# Patient Record
Sex: MALE | Race: WHITE | NOT HISPANIC OR LATINO | Employment: OTHER | URBAN - METROPOLITAN AREA
[De-identification: names, ages, dates, MRNs, and addresses within clinical notes are randomized per-mention and may not be internally consistent; named-entity substitution may affect disease eponyms.]

---

## 2023-03-15 ENCOUNTER — TELEPHONE (OUTPATIENT)
Dept: PRIMARY CARE | Facility: CLINIC | Age: 83
End: 2023-03-15
Payer: MEDICARE

## 2023-03-15 DIAGNOSIS — E10.65 TYPE 1 DIABETES MELLITUS WITH HYPERGLYCEMIA (MULTI): ICD-10-CM

## 2023-03-15 NOTE — TELEPHONE ENCOUNTER
He says the Dexcom needs a prior authorization    Please call [phone]-[facility]

## 2023-03-16 RX ORDER — BUSPIRONE HYDROCHLORIDE 30 MG/1
1 TABLET ORAL 2 TIMES DAILY
COMMUNITY
Start: 2020-04-22 | End: 2023-12-11

## 2023-03-16 RX ORDER — CARVEDILOL 12.5 MG/1
1 TABLET ORAL EVERY 12 HOURS
COMMUNITY
Start: 2016-05-19 | End: 2023-08-07

## 2023-03-16 RX ORDER — ISOPROPYL ALCOHOL 0.75 G/1
SWAB TOPICAL
COMMUNITY
Start: 2022-06-14

## 2023-03-16 RX ORDER — ROSUVASTATIN CALCIUM 40 MG/1
1 TABLET, COATED ORAL DAILY
COMMUNITY
Start: 2019-03-04 | End: 2023-09-19 | Stop reason: SDUPTHER

## 2023-03-16 RX ORDER — LISINOPRIL AND HYDROCHLOROTHIAZIDE 20; 25 MG/1; MG/1
1 TABLET ORAL DAILY
COMMUNITY
Start: 2019-11-14 | End: 2023-09-19 | Stop reason: SDUPTHER

## 2023-03-16 RX ORDER — INSULIN ASPART 100 [IU]/ML
4 INJECTION, SOLUTION INTRAVENOUS; SUBCUTANEOUS
COMMUNITY
Start: 2016-05-19 | End: 2023-06-05 | Stop reason: SDUPTHER

## 2023-03-16 RX ORDER — BLOOD-GLUCOSE,RECEIVER,CONT
EACH MISCELLANEOUS
Qty: 3 EACH | Refills: 3 | Status: SHIPPED | OUTPATIENT
Start: 2023-03-16

## 2023-03-16 RX ORDER — BLOOD-GLUCOSE SENSOR
EACH MISCELLANEOUS
Qty: 1 EACH | Refills: 0 | Status: SHIPPED | OUTPATIENT
Start: 2023-03-16

## 2023-03-16 RX ORDER — LANSOPRAZOLE 30 MG/1
1 CAPSULE, DELAYED RELEASE ORAL
COMMUNITY
Start: 2020-01-08 | End: 2023-07-20 | Stop reason: SDUPTHER

## 2023-03-16 RX ORDER — INSULIN GLARGINE 300 U/ML
48 INJECTION, SOLUTION SUBCUTANEOUS
COMMUNITY
Start: 2019-12-11 | End: 2024-02-13

## 2023-03-16 RX ORDER — TRIAMCINOLONE ACETONIDE 5 MG/G
CREAM TOPICAL
COMMUNITY
Start: 2022-04-25

## 2023-03-16 RX ORDER — GLUCAGON 3 MG/1
POWDER NASAL
COMMUNITY
Start: 2021-07-12

## 2023-03-16 RX ORDER — PAROXETINE HYDROCHLORIDE 40 MG/1
1 TABLET, FILM COATED ORAL DAILY
COMMUNITY
Start: 2016-06-27 | End: 2023-06-05

## 2023-03-16 RX ORDER — ASPIRIN 81 MG/1
1 TABLET ORAL DAILY
COMMUNITY
Start: 2016-05-19 | End: 2023-06-05

## 2023-03-21 NOTE — TELEPHONE ENCOUNTER
Patient is calling to let you know he now has an account with [facility] and you can sent the script for the Dexcom to them.

## 2023-05-09 ENCOUNTER — TELEPHONE (OUTPATIENT)
Dept: PRIMARY CARE | Facility: CLINIC | Age: 83
End: 2023-05-09

## 2023-05-31 ENCOUNTER — TELEPHONE (OUTPATIENT)
Dept: PRIMARY CARE | Facility: CLINIC | Age: 83
End: 2023-05-31
Payer: MEDICARE

## 2023-05-31 NOTE — TELEPHONE ENCOUNTER
Patient has been dealing with a lot of neuropathy pain in his legs and is having trouble walking any distance. He has tried Tylenol and Ibuprofen with no relief, he is asking if you will prescribe something.       [facility]

## 2023-06-01 PROBLEM — E11.649 DIABETIC HYPOGLYCEMIA (MULTI): Status: ACTIVE | Noted: 2023-06-01

## 2023-06-01 PROBLEM — R54 FRAILTY SYNDROME IN GERIATRIC PATIENT: Status: ACTIVE | Noted: 2023-06-01

## 2023-06-01 PROBLEM — I12.9 HYPERTENSION ASSOCIATED WITH STAGE 3 CHRONIC KIDNEY DISEASE DUE TO TYPE 2 DIABETES MELLITUS (MULTI): Status: ACTIVE | Noted: 2023-06-01

## 2023-06-01 PROBLEM — I25.10 ASHD (ARTERIOSCLEROTIC HEART DISEASE): Status: ACTIVE | Noted: 2023-06-01

## 2023-06-01 PROBLEM — F33.42 DEPRESSION, MAJOR, RECURRENT, IN COMPLETE REMISSION (CMS-HCC): Status: ACTIVE | Noted: 2023-06-01

## 2023-06-01 PROBLEM — M48.062 LUMBAR STENOSIS WITH NEUROGENIC CLAUDICATION: Status: ACTIVE | Noted: 2023-06-01

## 2023-06-01 PROBLEM — M19.90 ARTHRITIS: Status: ACTIVE | Noted: 2023-06-01

## 2023-06-01 PROBLEM — M51.36 LUMBAR DEGENERATIVE DISC DISEASE: Status: ACTIVE | Noted: 2023-06-01

## 2023-06-01 PROBLEM — E10.9 TYPE 1 DIABETES MELLITUS (MULTI): Status: ACTIVE | Noted: 2023-06-01

## 2023-06-01 PROBLEM — C44.90 SKIN CANCER: Status: ACTIVE | Noted: 2023-06-01

## 2023-06-01 PROBLEM — U07.1 COVID-19: Status: ACTIVE | Noted: 2023-06-01

## 2023-06-01 PROBLEM — M51.369 LUMBAR DEGENERATIVE DISC DISEASE: Status: ACTIVE | Noted: 2023-06-01

## 2023-06-01 PROBLEM — I10 BENIGN ESSENTIAL HYPERTENSION: Status: ACTIVE | Noted: 2023-06-01

## 2023-06-01 PROBLEM — E11.22 HYPERTENSION ASSOCIATED WITH STAGE 3 CHRONIC KIDNEY DISEASE DUE TO TYPE 2 DIABETES MELLITUS (MULTI): Status: ACTIVE | Noted: 2023-06-01

## 2023-06-01 PROBLEM — L57.0 ACTINIC KERATOSIS OF LEFT TEMPLE: Status: RESOLVED | Noted: 2023-06-01 | Resolved: 2023-06-01

## 2023-06-01 PROBLEM — N18.30 HYPERTENSION ASSOCIATED WITH STAGE 3 CHRONIC KIDNEY DISEASE DUE TO TYPE 2 DIABETES MELLITUS (MULTI): Status: ACTIVE | Noted: 2023-06-01

## 2023-06-01 PROBLEM — F41.8 ANXIETY ASSOCIATED WITH DEPRESSION: Status: ACTIVE | Noted: 2023-06-01

## 2023-06-01 PROBLEM — G47.33 OBSTRUCTIVE SLEEP APNEA OF ADULT: Status: ACTIVE | Noted: 2023-06-01

## 2023-06-01 PROBLEM — E78.5 DYSLIPIDEMIA, GOAL LDL BELOW 70: Status: ACTIVE | Noted: 2023-06-01

## 2023-06-01 PROBLEM — L57.0 ACTINIC KERATOSIS OF SCALP: Status: RESOLVED | Noted: 2023-06-01 | Resolved: 2023-06-01

## 2023-06-01 PROBLEM — K21.9 GERD (GASTROESOPHAGEAL REFLUX DISEASE): Status: ACTIVE | Noted: 2023-06-01

## 2023-06-01 RX ORDER — LANCETS
EACH MISCELLANEOUS AS NEEDED
COMMUNITY
Start: 2022-08-21

## 2023-06-01 RX ORDER — BLOOD SUGAR DIAGNOSTIC
STRIP MISCELLANEOUS AS NEEDED
COMMUNITY
Start: 2023-04-03 | End: 2023-10-12

## 2023-06-05 ENCOUNTER — OFFICE VISIT (OUTPATIENT)
Dept: PRIMARY CARE | Facility: CLINIC | Age: 83
End: 2023-06-05
Payer: MEDICARE

## 2023-06-05 VITALS
BODY MASS INDEX: 36.46 KG/M2 | SYSTOLIC BLOOD PRESSURE: 140 MMHG | WEIGHT: 240.6 LBS | OXYGEN SATURATION: 96 % | HEIGHT: 68 IN | HEART RATE: 57 BPM | DIASTOLIC BLOOD PRESSURE: 66 MMHG

## 2023-06-05 DIAGNOSIS — F33.42 DEPRESSION, MAJOR, RECURRENT, IN COMPLETE REMISSION (CMS-HCC): ICD-10-CM

## 2023-06-05 DIAGNOSIS — E08.22 DIABETES MELLITUS DUE TO UNDERLYING CONDITION WITH STAGE 3A CHRONIC KIDNEY DISEASE, WITH LONG-TERM CURRENT USE OF INSULIN (MULTI): ICD-10-CM

## 2023-06-05 DIAGNOSIS — Z79.4 DIABETES MELLITUS DUE TO UNDERLYING CONDITION WITH STAGE 3A CHRONIC KIDNEY DISEASE, WITH LONG-TERM CURRENT USE OF INSULIN (MULTI): ICD-10-CM

## 2023-06-05 DIAGNOSIS — M25.552 JOINT PAIN OF LEFT HIP ON MOVEMENT: Primary | ICD-10-CM

## 2023-06-05 DIAGNOSIS — N18.31 DIABETES MELLITUS DUE TO UNDERLYING CONDITION WITH STAGE 3A CHRONIC KIDNEY DISEASE, WITH LONG-TERM CURRENT USE OF INSULIN (MULTI): ICD-10-CM

## 2023-06-05 DIAGNOSIS — M48.062 LUMBAR STENOSIS WITH NEUROGENIC CLAUDICATION: ICD-10-CM

## 2023-06-05 DIAGNOSIS — E10.649 TYPE 1 DIABETES MELLITUS WITH HYPOGLYCEMIA AND WITHOUT COMA (MULTI): ICD-10-CM

## 2023-06-05 DIAGNOSIS — E11.649 DIABETIC HYPOGLYCEMIA (MULTI): ICD-10-CM

## 2023-06-05 DIAGNOSIS — E78.5 DYSLIPIDEMIA, GOAL LDL BELOW 70: ICD-10-CM

## 2023-06-05 PROBLEM — E66.01 CLASS 2 SEVERE OBESITY DUE TO EXCESS CALORIES WITH SERIOUS COMORBIDITY AND BODY MASS INDEX (BMI) OF 36.0 TO 36.9 IN ADULT (MULTI): Status: ACTIVE | Noted: 2023-06-05

## 2023-06-05 PROBLEM — I10 BENIGN ESSENTIAL HYPERTENSION: Status: RESOLVED | Noted: 2023-06-01 | Resolved: 2023-06-05

## 2023-06-05 PROBLEM — E66.812 CLASS 2 SEVERE OBESITY DUE TO EXCESS CALORIES WITH SERIOUS COMORBIDITY AND BODY MASS INDEX (BMI) OF 36.0 TO 36.9 IN ADULT: Status: ACTIVE | Noted: 2023-06-05

## 2023-06-05 PROCEDURE — 1160F RVW MEDS BY RX/DR IN RCRD: CPT | Performed by: INTERNAL MEDICINE

## 2023-06-05 PROCEDURE — 99213 OFFICE O/P EST LOW 20 MIN: CPT | Performed by: INTERNAL MEDICINE

## 2023-06-05 PROCEDURE — 1159F MED LIST DOCD IN RCRD: CPT | Performed by: INTERNAL MEDICINE

## 2023-06-05 PROCEDURE — 3077F SYST BP >= 140 MM HG: CPT | Performed by: INTERNAL MEDICINE

## 2023-06-05 PROCEDURE — 1036F TOBACCO NON-USER: CPT | Performed by: INTERNAL MEDICINE

## 2023-06-05 PROCEDURE — 3078F DIAST BP <80 MM HG: CPT | Performed by: INTERNAL MEDICINE

## 2023-06-05 RX ORDER — INSULIN ASPART 100 [IU]/ML
100 INJECTION, SOLUTION INTRAVENOUS; SUBCUTANEOUS
COMMUNITY
End: 2024-03-14

## 2023-06-05 ASSESSMENT — ANXIETY QUESTIONNAIRES
1. FEELING NERVOUS, ANXIOUS, OR ON EDGE: NOT AT ALL
5. BEING SO RESTLESS THAT IT IS HARD TO SIT STILL: NOT AT ALL
4. TROUBLE RELAXING: NOT AT ALL
6. BECOMING EASILY ANNOYED OR IRRITABLE: NOT AT ALL
GAD7 TOTAL SCORE: 0
2. NOT BEING ABLE TO STOP OR CONTROL WORRYING: NOT AT ALL
3. WORRYING TOO MUCH ABOUT DIFFERENT THINGS: NOT AT ALL
IF YOU CHECKED OFF ANY PROBLEMS ON THIS QUESTIONNAIRE, HOW DIFFICULT HAVE THESE PROBLEMS MADE IT FOR YOU TO DO YOUR WORK, TAKE CARE OF THINGS AT HOME, OR GET ALONG WITH OTHER PEOPLE: NOT DIFFICULT AT ALL
7. FEELING AFRAID AS IF SOMETHING AWFUL MIGHT HAPPEN: NOT AT ALL

## 2023-06-05 ASSESSMENT — ENCOUNTER SYMPTOMS
LOSS OF SENSATION IN FEET: 0
LEG PAIN: 1
ARTHRALGIAS: 1
DEPRESSION: 0
OCCASIONAL FEELINGS OF UNSTEADINESS: 0

## 2023-06-05 ASSESSMENT — COLUMBIA-SUICIDE SEVERITY RATING SCALE - C-SSRS
1. IN THE PAST MONTH, HAVE YOU WISHED YOU WERE DEAD OR WISHED YOU COULD GO TO SLEEP AND NOT WAKE UP?: NO
6. HAVE YOU EVER DONE ANYTHING, STARTED TO DO ANYTHING, OR PREPARED TO DO ANYTHING TO END YOUR LIFE?: NO
2. HAVE YOU ACTUALLY HAD ANY THOUGHTS OF KILLING YOURSELF?: NO

## 2023-06-05 ASSESSMENT — PATIENT HEALTH QUESTIONNAIRE - PHQ9
2. FEELING DOWN, DEPRESSED OR HOPELESS: NOT AT ALL
1. LITTLE INTEREST OR PLEASURE IN DOING THINGS: NOT AT ALL
SUM OF ALL RESPONSES TO PHQ9 QUESTIONS 1 AND 2: 0

## 2023-06-05 NOTE — PROGRESS NOTES
"Subjective   Reason for Visit: Juanjose Douglass is an 82 y.o. male here for a   visit.     Past Medical, Surgical, and Family History reviewed and updated in chart.    Reviewed all medications by prescribing practitioner or clinical pharmacist (such as prescriptions, OTCs, herbal therapies and supplements) and documented in the medical record.    Hypoglycemia event 2 months ago taking a shower dropped to 42 two months fell straight down and leg anterior thigh hurts  and leg is weak. No pain at rest but getting out of chair and standing produces heidy even with walker . Localized no pain in back or lower leg .  Cannot swing golf club without losing balance  at follow through . Groin pain     Leg Pain   The injury mechanism was a fall. The pain is present in the left hip and left thigh. The quality of the pain is described as burning. The pain is at a severity of 8/10. The pain is moderate. The pain has been Worsening since onset. The symptoms are aggravated by movement and weight bearing. He has tried NSAIDs for the symptoms. The treatment provided mild relief.       Patient Care Team:  Ki Browning DO as PCP - General  Ki Browning DO as PCP - [employer] PCP     Review of Systems   Musculoskeletal:  Positive for arthralgias.       Objective   Vitals:  /66   Pulse 57   Ht 1.727 m (5' 8\")   Wt 109 kg (240 lb 9.6 oz)   SpO2 96%   BMI 36.58 kg/m²       Physical Exam  Vitals and nursing note reviewed.   Constitutional:       General: He is not in acute distress.     Appearance: Normal appearance. He is well-developed. He is not toxic-appearing.   HENT:      Head: Normocephalic and atraumatic.      Right Ear: Tympanic membrane and external ear normal.      Left Ear: Tympanic membrane and external ear normal.      Nose: Nose normal.      Mouth/Throat:      Mouth: Mucous membranes are moist.      Pharynx: Oropharynx is clear. No oropharyngeal exudate or posterior oropharyngeal " erythema.      Tonsils: No tonsillar exudate. 2+ on the right. 2+ on the left.   Eyes:      Extraocular Movements: Extraocular movements intact.      Conjunctiva/sclera: Conjunctivae normal.   Cardiovascular:      Rate and Rhythm: Normal rate and regular rhythm.      Pulses: Normal pulses.      Heart sounds: Normal heart sounds. No murmur heard.  Pulmonary:      Effort: Pulmonary effort is normal.      Breath sounds: Normal breath sounds.   Abdominal:      General: Abdomen is flat. Bowel sounds are normal.      Palpations: Abdomen is soft.   Musculoskeletal:      Cervical back: Neck supple.   Feet:      Right foot:      Skin integrity: Skin integrity normal. No ulcer, blister, skin breakdown, erythema, warmth or callus.      Toenail Condition: Right toenails are normal.      Left foot:      Skin integrity: Skin integrity normal. No ulcer, blister, skin breakdown, erythema, warmth or callus.      Toenail Condition: Left toenails are normal.   Lymphadenopathy:      Cervical: No cervical adenopathy.   Skin:     General: Skin is warm and dry.      Capillary Refill: Capillary refill takes more than 3 seconds.      Findings: No rash.   Neurological:      Mental Status: He is alert. Mental status is at baseline.      Sensory: Sensation is intact.   Psychiatric:         Mood and Affect: Mood normal.         Behavior: Behavior normal.         Thought Content: Thought content normal.         Judgment: Judgment normal.         Assessment/Plan   Problem List Items Addressed This Visit          Musculoskeletal    Lumbar stenosis with neurogenic claudication    Current Assessment & Plan     Consider reevaluation with pain management for pain control in the setting of lumbar spine stenosis         Relevant Orders    XR femur left 2+ views    Referral to Orthopaedic Surgery    Comprehensive Metabolic Panel    Hemoglobin A1C    Lipid Panel    Albumin , Urine Random    Vitamin D 25-Hydroxy,Total    Vitamin B12    CBC and Auto  Differential    Joint pain of left hip on movement - Primary    Current Assessment & Plan     Suspect suspect severe osteoarthritis of the hip we will check x-rays of the left hip could be referred pain from lumbar spine presents as anterior thigh pain worse with prolonged standing and ambulation better at rest Limited range of motion with movement of hip and range of motion with OMAIRA testing.  Sensory pain in L2-L3 distribution denies any back pain or lower leg pain or swelling reevaluate with x-rays of the femur and hip after patient sustained a fall 2 months ago         Relevant Orders    XR hip left 2 or 3 views    XR femur left 2+ views    Referral to Orthopaedic Surgery    Comprehensive Metabolic Panel    Hemoglobin A1C    Lipid Panel    Albumin , Urine Random    Vitamin D 25-Hydroxy,Total    Vitamin B12    CBC and Auto Differential       Endocrine/Metabolic    Diabetic hypoglycemia (CMS/HCC)    Current Assessment & Plan     Stable and improved at this time has glucagon in case of emergency as well as use of glucose tablets recent modifications in insulin have been helpful         Diabetes 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 3a chronic kidney disease, with long-term current use of insulin (CMS/HCC)    Current Assessment & Plan     Repeat hemoglobin A1c on insulin management therapy hemoglobin A1c goal less than 9.0 with history of hypoglycemia          Type 1 diabetes mellitus (CMS/HCC)    Current Assessment & Plan     Continue with basal bolus treatment with Lantus and Humalog insulin            Other    Depression, major, recurrent, in complete remission (CMS/HCC)    Current Assessment & Plan     Continue buspirone 30 mg twice a day         Dyslipidemia, goal LDL below 70    Current Assessment & Plan     Repeat lipid panel maximal dose rosuvastatin 40 mg daily LDL goal less than 70

## 2023-06-06 NOTE — ASSESSMENT & PLAN NOTE
Suspect suspect severe osteoarthritis of the hip we will check x-rays of the left hip could be referred pain from lumbar spine presents as anterior thigh pain worse with prolonged standing and ambulation better at rest Limited range of motion with movement of hip and range of motion with OMAIRA testing.  Sensory pain in L2-L3 distribution denies any back pain or lower leg pain or swelling reevaluate with x-rays of the femur and hip after patient sustained a fall 2 months ago

## 2023-06-06 NOTE — ASSESSMENT & PLAN NOTE
Repeat hemoglobin A1c on insulin management therapy hemoglobin A1c goal less than 9.0 with history of hypoglycemia

## 2023-06-06 NOTE — ASSESSMENT & PLAN NOTE
Stable and improved at this time has glucagon in case of emergency as well as use of glucose tablets recent modifications in insulin have been helpful

## 2023-06-06 NOTE — ASSESSMENT & PLAN NOTE
Consider reevaluation with pain management for pain control in the setting of lumbar spine stenosis

## 2023-06-06 NOTE — ASSESSMENT & PLAN NOTE
>>ASSESSMENT AND PLAN FOR TYPE 1 DIABETES MELLITUS WITH HYPOGLYCEMIA AND WITHOUT COMA (CMS/[facility]) WRITTEN ON 6/5/2023 11:49 PM BY YAJAIRA FAJARDO, DO    Continue with basal bolus treatment with Lantus and Humalog insulin    >>ASSESSMENT AND PLAN FOR DIABETIC HYPOGLYCEMIA (CMS/[facility]) WRITTEN ON 6/5/2023 11:44 PM BY YAJAIRA FAJARDO, DO    Stable and improved at this time has glucagon in case of emergency as well as use of glucose tablets recent modifications in insulin have been helpful

## 2023-07-15 DIAGNOSIS — F33.42 DEPRESSION, MAJOR, RECURRENT, IN COMPLETE REMISSION (CMS-HCC): ICD-10-CM

## 2023-07-15 DIAGNOSIS — K21.9 GASTROESOPHAGEAL REFLUX DISEASE WITHOUT ESOPHAGITIS: Primary | ICD-10-CM

## 2023-07-20 RX ORDER — PAROXETINE HYDROCHLORIDE 40 MG/1
TABLET, FILM COATED ORAL
Qty: 90 TABLET | Refills: 2 | Status: SHIPPED | OUTPATIENT
Start: 2023-07-20 | End: 2023-10-11 | Stop reason: ALTCHOICE

## 2023-07-20 RX ORDER — LANSOPRAZOLE 30 MG/1
CAPSULE, DELAYED RELEASE ORAL
Qty: 90 CAPSULE | Refills: 2 | Status: SHIPPED | OUTPATIENT
Start: 2023-07-20

## 2023-07-24 ENCOUNTER — TELEPHONE (OUTPATIENT)
Dept: PRIMARY CARE | Facility: CLINIC | Age: 83
End: 2023-07-24
Payer: MEDICARE

## 2023-07-24 DIAGNOSIS — M51.36 LUMBAR DEGENERATIVE DISC DISEASE: ICD-10-CM

## 2023-07-24 DIAGNOSIS — M48.062 LUMBAR STENOSIS WITH NEUROGENIC CLAUDICATION: ICD-10-CM

## 2023-07-24 DIAGNOSIS — R54 FRAILTY SYNDROME IN GERIATRIC PATIENT: ICD-10-CM

## 2023-07-24 DIAGNOSIS — M19.90 ARTHRITIS: ICD-10-CM

## 2023-07-24 DIAGNOSIS — M25.552 JOINT PAIN OF LEFT HIP ON MOVEMENT: ICD-10-CM

## 2023-08-05 DIAGNOSIS — I25.10 ASHD (ARTERIOSCLEROTIC HEART DISEASE): Primary | ICD-10-CM

## 2023-08-07 RX ORDER — CARVEDILOL 12.5 MG/1
12.5 TABLET ORAL EVERY 12 HOURS
Qty: 180 TABLET | Refills: 3 | Status: SHIPPED | OUTPATIENT
Start: 2023-08-07

## 2023-09-19 ENCOUNTER — TELEPHONE (OUTPATIENT)
Dept: PRIMARY CARE | Facility: CLINIC | Age: 83
End: 2023-09-19
Payer: MEDICARE

## 2023-09-19 DIAGNOSIS — I10 PRIMARY HYPERTENSION: ICD-10-CM

## 2023-09-19 DIAGNOSIS — E78.5 DYSLIPIDEMIA, GOAL LDL BELOW 70: Primary | ICD-10-CM

## 2023-09-19 RX ORDER — ROSUVASTATIN CALCIUM 40 MG/1
40 TABLET, COATED ORAL DAILY
Qty: 90 TABLET | Refills: 3 | Status: SHIPPED | OUTPATIENT
Start: 2023-09-19

## 2023-09-19 RX ORDER — LISINOPRIL AND HYDROCHLOROTHIAZIDE 20; 25 MG/1; MG/1
1 TABLET ORAL DAILY
Qty: 90 TABLET | Refills: 3 | Status: SHIPPED | OUTPATIENT
Start: 2023-09-19

## 2023-09-19 NOTE — TELEPHONE ENCOUNTER
Needs a refill on his Atorvastatin 40 mg takes it 1 time a day & Lisinopril 20-25 takes it 1 time a day please send to his mail ordert [facility]

## 2023-10-10 DIAGNOSIS — Z23 FLU VACCINE NEED: ICD-10-CM

## 2023-10-11 ENCOUNTER — OFFICE VISIT (OUTPATIENT)
Dept: PRIMARY CARE | Facility: CLINIC | Age: 83
End: 2023-10-11
Payer: MEDICARE

## 2023-10-11 VITALS
BODY MASS INDEX: 36.83 KG/M2 | SYSTOLIC BLOOD PRESSURE: 110 MMHG | HEIGHT: 68 IN | HEART RATE: 68 BPM | WEIGHT: 243 LBS | DIASTOLIC BLOOD PRESSURE: 68 MMHG

## 2023-10-11 DIAGNOSIS — I11.0 HYPERTENSIVE HEART DISEASE WITH CHRONIC DIASTOLIC CONGESTIVE HEART FAILURE (MULTI): ICD-10-CM

## 2023-10-11 DIAGNOSIS — E66.01 CLASS 2 SEVERE OBESITY DUE TO EXCESS CALORIES WITH SERIOUS COMORBIDITY AND BODY MASS INDEX (BMI) OF 36.0 TO 36.9 IN ADULT (MULTI): ICD-10-CM

## 2023-10-11 DIAGNOSIS — I50.32 HYPERTENSIVE HEART DISEASE WITH CHRONIC DIASTOLIC CONGESTIVE HEART FAILURE (MULTI): ICD-10-CM

## 2023-10-11 DIAGNOSIS — M48.062 LUMBAR STENOSIS WITH NEUROGENIC CLAUDICATION: ICD-10-CM

## 2023-10-11 DIAGNOSIS — Z79.4 DIABETES MELLITUS DUE TO UNDERLYING CONDITION WITH STAGE 3A CHRONIC KIDNEY DISEASE, WITH LONG-TERM CURRENT USE OF INSULIN (MULTI): ICD-10-CM

## 2023-10-11 DIAGNOSIS — E08.22 DIABETES MELLITUS DUE TO UNDERLYING CONDITION WITH STAGE 3A CHRONIC KIDNEY DISEASE, WITH LONG-TERM CURRENT USE OF INSULIN (MULTI): ICD-10-CM

## 2023-10-11 DIAGNOSIS — E78.5 DYSLIPIDEMIA, GOAL LDL BELOW 70: ICD-10-CM

## 2023-10-11 DIAGNOSIS — K21.9 GASTROESOPHAGEAL REFLUX DISEASE WITHOUT ESOPHAGITIS: ICD-10-CM

## 2023-10-11 DIAGNOSIS — E10.649 TYPE 1 DIABETES MELLITUS WITH HYPOGLYCEMIA AND WITHOUT COMA (MULTI): Primary | ICD-10-CM

## 2023-10-11 DIAGNOSIS — F33.42 DEPRESSION, MAJOR, RECURRENT, IN COMPLETE REMISSION (CMS-HCC): ICD-10-CM

## 2023-10-11 DIAGNOSIS — E11.649 DIABETIC HYPOGLYCEMIA (MULTI): ICD-10-CM

## 2023-10-11 DIAGNOSIS — R54 FRAILTY SYNDROME IN GERIATRIC PATIENT: ICD-10-CM

## 2023-10-11 DIAGNOSIS — N18.31 DIABETES MELLITUS DUE TO UNDERLYING CONDITION WITH STAGE 3A CHRONIC KIDNEY DISEASE, WITH LONG-TERM CURRENT USE OF INSULIN (MULTI): ICD-10-CM

## 2023-10-11 PROBLEM — M25.552 JOINT PAIN OF LEFT HIP ON MOVEMENT: Status: RESOLVED | Noted: 2023-06-05 | Resolved: 2023-10-11

## 2023-10-11 PROCEDURE — 1159F MED LIST DOCD IN RCRD: CPT | Performed by: INTERNAL MEDICINE

## 2023-10-11 PROCEDURE — 99214 OFFICE O/P EST MOD 30 MIN: CPT | Performed by: INTERNAL MEDICINE

## 2023-10-11 PROCEDURE — 1160F RVW MEDS BY RX/DR IN RCRD: CPT | Performed by: INTERNAL MEDICINE

## 2023-10-11 PROCEDURE — 1036F TOBACCO NON-USER: CPT | Performed by: INTERNAL MEDICINE

## 2023-10-11 NOTE — PROGRESS NOTES
"Subjective   Patient ID: Juanjose Douglass is a 83 y.o. male who presents for Follow-up.    HPI     Review of Systems    Objective   /68 (BP Location: Left arm, Patient Position: Sitting)   Pulse 68   Ht 1.727 m (5' 8\")   Wt 110 kg (243 lb)   BMI 36.95 kg/m²     Physical Exam    Assessment/Plan          "

## 2023-10-11 NOTE — ASSESSMENT & PLAN NOTE
Continues to manage anxiety and depression symptoms well on buspirone 30 mg twice a day patient discontinued paroxetine

## 2023-10-11 NOTE — PROGRESS NOTES
"Subjective   Reason for Visit: Juanjose Douglass is an 83 y.o. male here for a fu visit.     Past Medical, Surgical, and Family History reviewed and updated in chart.    Reviewed all medications by prescribing practitioner or clinical pharmacist (such as prescriptions, OTCs, herbal therapies and supplements) and documented in the medical record.    HPI    Patient Care Team:  Ki Browning DO as PCP - General  Ki Browning DO as PCP - [employer] PCP     Review of Systems   All other systems reviewed and are negative.      Objective   Vitals:  /68 (BP Location: Left arm, Patient Position: Sitting)   Pulse 68   Ht 1.727 m (5' 8\")   Wt 110 kg (243 lb)   BMI 36.95 kg/m²       Physical Exam  Vitals and nursing note reviewed.   Constitutional:       General: He is not in acute distress.     Appearance: Normal appearance. He is well-developed. He is obese. He is not toxic-appearing.   HENT:      Head: Normocephalic and atraumatic.      Right Ear: Tympanic membrane and external ear normal.      Left Ear: Tympanic membrane and external ear normal.      Nose: Nose normal.      Mouth/Throat:      Mouth: Mucous membranes are moist.      Pharynx: Oropharynx is clear. No oropharyngeal exudate or posterior oropharyngeal erythema.      Tonsils: No tonsillar exudate. 2+ on the right. 2+ on the left.   Eyes:      Extraocular Movements: Extraocular movements intact.      Conjunctiva/sclera: Conjunctivae normal.   Cardiovascular:      Rate and Rhythm: Normal rate and regular rhythm.      Pulses: Normal pulses.      Heart sounds: Normal heart sounds. No murmur heard.  Pulmonary:      Effort: Pulmonary effort is normal.      Breath sounds: Normal breath sounds.   Abdominal:      General: Abdomen is flat. Bowel sounds are normal.      Palpations: Abdomen is soft.   Musculoskeletal:      Cervical back: Neck supple.   Feet:      Right foot:      Skin integrity: Skin integrity normal. No ulcer, blister, " skin breakdown, erythema, warmth or callus.      Toenail Condition: Right toenails are normal.      Left foot:      Skin integrity: Skin integrity normal. No ulcer, blister, skin breakdown, erythema, warmth or callus.      Toenail Condition: Left toenails are normal.   Lymphadenopathy:      Cervical: No cervical adenopathy.   Skin:     General: Skin is warm and dry.      Capillary Refill: Capillary refill takes more than 3 seconds.      Findings: No rash.   Neurological:      Mental Status: He is alert. Mental status is at baseline.      Sensory: Sensation is intact.   Psychiatric:         Mood and Affect: Mood normal.         Behavior: Behavior normal.         Thought Content: Thought content normal.         Judgment: Judgment normal.         Assessment/Plan   Problem List Items Addressed This Visit       Depression, major, recurrent, in complete remission (CMS/HCC)    Current Assessment & Plan     Continues to manage anxiety and depression symptoms well on buspirone 30 mg twice a day patient discontinued paroxetine         Diabetic hypoglycemia (CMS/HCC)    Current Assessment & Plan     Significant improvement in limiting hypoglycemia with initiation of Dexcom G for continuous glucose monitoring patient averaging blood sugars between 150 and 200 Stam most times under 200 with no significant hypoglycemic events reevaluate hemoglobin A1c when he returns continue Toujeo insulin at 48 units daily with use of NovoLog insulin with meals averaging between 5 and 10 units daily with meals safe         Relevant Orders    Comprehensive Metabolic Panel    Hemoglobin A1C    Lipid Panel    Albumin , Urine Random    CBC and Auto Differential    Follow Up In [facility] - PCP - Established    Tsh With Reflex To Free T4 If Abnormal    Dyslipidemia, goal LDL below 70    Current Assessment & Plan     Reevaluate lipid profile with rosuvastatin 40 mg 1 tablet daily LDL goal less than 70         Relevant Orders     Comprehensive Metabolic Panel    Hemoglobin A1C    Lipid Panel    Albumin , Urine Random    CBC and Auto Differential    Follow Up In [facility] - PCP - Established    Tsh With Reflex To Free T4 If Abnormal    Frailty syndrome in geriatric patient    Current Assessment & Plan     Stable at this time has good family support         Relevant Orders    Comprehensive Metabolic Panel    Hemoglobin A1C    Lipid Panel    Albumin , Urine Random    CBC and Auto Differential    Follow Up In [facility] - PCP - Established    Tsh With Reflex To Free T4 If Abnormal    GERD (gastroesophageal reflux disease)    Current Assessment & Plan     Symptoms controlled on lansoprazole 30 mg daily         Diabetes 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 3a chronic kidney disease, with long-term current use of insulin (CMS/[facility])    Current Assessment & Plan     Reevaluate complete metabolic profile and urine microalbumin         Relevant Orders    Comprehensive Metabolic Panel    Hemoglobin A1C    Lipid Panel    Albumin , Urine Random    CBC and Auto Differential    Follow Up In [facility] - PCP - Established    Tsh With Reflex To Free T4 If Abnormal    Lumbar stenosis with neurogenic claudication    Current Assessment & Plan     Continue to manage with activity as tolerated intolerant with gabapentin due to side effects         Type 1 diabetes mellitus (CMS/[facility]) - Primary    Current Assessment & Plan     No significant hypoglycemic events or DKA noted reevaluate with blood work         Relevant Orders    Comprehensive Metabolic Panel    Hemoglobin A1C    Lipid Panel    Albumin , Urine Random    CBC and Auto Differential    Follow Up In [facility] - PCP - Established    Tsh With Reflex To Free T4 If Abnormal    Class 2 severe obesity due to excess calories with serious comorbidity and body mass index (BMI) of 36.0 to 36.9 in adult (CMS/[facility])    Overview     Continue to work with regular  exercise and reduce consumption of high processed foods optimizing A1c goal less than 9.0 while avoiding unnecessary hypoglycemia secondary to age and frailty         Current Assessment & Plan     Continue to work with regular exercise and reduce consumption of high processed foods optimizing A1c goal less than 9.0 while avoiding unnecessary hypoglycemia secondary to age and   Patient hampered by lack of activity related to chronic radicular pain of the low back          Relevant Orders    Comprehensive Metabolic Panel    Hemoglobin A1C    Lipid Panel    Albumin , Urine Random    CBC and Auto Differential    Follow Up In [facility] - PCP - Established    Tsh With Reflex To Free T4 If Abnormal    Hypertensive heart disease with chronic diastolic congestive heart failure (CMS/HCC)    Current Assessment & Plan     Stable well compensated on carvedilol 25 mg twice a day and lisinopril hydrochlorothiazide 20/25 1 tablet daily well compensated with good blood pressure control continue reevaluation in 3 months updated influenza vaccination today

## 2023-10-11 NOTE — ASSESSMENT & PLAN NOTE
Significant improvement in limiting hypoglycemia with initiation of Dexcom G for continuous glucose monitoring patient averaging blood sugars between 150 and 200 Stam most times under 200 with no significant hypoglycemic events reevaluate hemoglobin A1c when he returns continue Toujeo insulin at 48 units daily with use of NovoLog insulin with meals averaging between 5 and 10 units daily with meals safe

## 2023-10-11 NOTE — ASSESSMENT & PLAN NOTE
>>ASSESSMENT AND PLAN FOR TYPE 1 DIABETES MELLITUS WITH HYPOGLYCEMIA AND WITHOUT COMA (CMS/[facility]) WRITTEN ON 10/11/2023  1:52 PM BY YAJAIRA FAJARDO, DO    No significant hypoglycemic events or DKA noted reevaluate with blood work    >>ASSESSMENT AND PLAN FOR DIABETIC HYPOGLYCEMIA (CMS/[facility]) WRITTEN ON 10/11/2023  1:52 PM BY YAJAIRA FAJARDO, DO    Significant improvement in limiting hypoglycemia with initiation of Dexcom G for continuous glucose monitoring patient averaging blood sugars between 150 and 200 Stam most times under 200 with no significant hypoglycemic events reevaluate hemoglobin A1c when he returns continue Toujeo insulin at 48 units daily with use of NovoLog insulin with meals averaging between 5 and 10 units daily with meals safe

## 2023-10-11 NOTE — ASSESSMENT & PLAN NOTE
Continue to work with regular exercise and reduce consumption of high processed foods optimizing A1c goal less than 9.0 while avoiding unnecessary hypoglycemia secondary to age and   Patient hampered by lack of activity related to chronic radicular pain of the low back

## 2023-10-11 NOTE — ASSESSMENT & PLAN NOTE
Stable well compensated on carvedilol 25 mg twice a day and lisinopril hydrochlorothiazide 20/25 1 tablet daily well compensated with good blood pressure control continue reevaluation in 3 months updated influenza vaccination today

## 2023-10-12 RX ORDER — BLOOD SUGAR DIAGNOSTIC
STRIP MISCELLANEOUS
Qty: 350 STRIP | Refills: 10 | Status: SHIPPED | OUTPATIENT
Start: 2023-10-12

## 2023-12-09 DIAGNOSIS — F33.42 DEPRESSION, MAJOR, RECURRENT, IN COMPLETE REMISSION (CMS-HCC): Primary | ICD-10-CM

## 2023-12-09 DIAGNOSIS — F41.9 ANXIETY: ICD-10-CM

## 2023-12-11 RX ORDER — BUSPIRONE HYDROCHLORIDE 30 MG/1
30 TABLET ORAL 2 TIMES DAILY
Qty: 180 TABLET | Refills: 3 | Status: SHIPPED | OUTPATIENT
Start: 2023-12-11

## 2024-01-17 ENCOUNTER — LAB (OUTPATIENT)
Dept: LAB | Facility: LAB | Age: 84
End: 2024-01-17
Payer: MEDICARE

## 2024-01-17 DIAGNOSIS — E11.649 DIABETIC HYPOGLYCEMIA (MULTI): ICD-10-CM

## 2024-01-17 DIAGNOSIS — E10.649 TYPE 1 DIABETES MELLITUS WITH HYPOGLYCEMIA AND WITHOUT COMA (MULTI): ICD-10-CM

## 2024-01-17 DIAGNOSIS — E03.9 HYPOTHYROIDISM (ACQUIRED): Primary | ICD-10-CM

## 2024-01-17 DIAGNOSIS — E66.01 CLASS 2 SEVERE OBESITY DUE TO EXCESS CALORIES WITH SERIOUS COMORBIDITY AND BODY MASS INDEX (BMI) OF 36.0 TO 36.9 IN ADULT (MULTI): ICD-10-CM

## 2024-01-17 DIAGNOSIS — E55.9 VITAMIN D DEFICIENCY: ICD-10-CM

## 2024-01-17 DIAGNOSIS — Z79.4 DIABETES MELLITUS DUE TO UNDERLYING CONDITION WITH STAGE 3A CHRONIC KIDNEY DISEASE, WITH LONG-TERM CURRENT USE OF INSULIN (MULTI): ICD-10-CM

## 2024-01-17 DIAGNOSIS — M25.552 JOINT PAIN OF LEFT HIP ON MOVEMENT: ICD-10-CM

## 2024-01-17 DIAGNOSIS — E78.5 DYSLIPIDEMIA, GOAL LDL BELOW 70: ICD-10-CM

## 2024-01-17 DIAGNOSIS — R54 FRAILTY SYNDROME IN GERIATRIC PATIENT: ICD-10-CM

## 2024-01-17 DIAGNOSIS — E08.22 DIABETES MELLITUS DUE TO UNDERLYING CONDITION WITH STAGE 3A CHRONIC KIDNEY DISEASE, WITH LONG-TERM CURRENT USE OF INSULIN (MULTI): ICD-10-CM

## 2024-01-17 DIAGNOSIS — N18.31 DIABETES MELLITUS DUE TO UNDERLYING CONDITION WITH STAGE 3A CHRONIC KIDNEY DISEASE, WITH LONG-TERM CURRENT USE OF INSULIN (MULTI): ICD-10-CM

## 2024-01-17 DIAGNOSIS — M48.062 LUMBAR STENOSIS WITH NEUROGENIC CLAUDICATION: ICD-10-CM

## 2024-01-17 LAB
25(OH)D3 SERPL-MCNC: 15 NG/ML (ref 30–100)
ALBUMIN SERPL BCP-MCNC: 3.7 G/DL (ref 3.4–5)
ALP SERPL-CCNC: 87 U/L (ref 33–136)
ALT SERPL W P-5'-P-CCNC: 13 U/L (ref 10–52)
ANION GAP SERPL CALC-SCNC: 15 MMOL/L (ref 10–20)
AST SERPL W P-5'-P-CCNC: 19 U/L (ref 9–39)
BASOPHILS # BLD AUTO: 0.06 X10*3/UL (ref 0–0.1)
BASOPHILS NFR BLD AUTO: 1.1 %
BILIRUB SERPL-MCNC: 0.4 MG/DL (ref 0–1.2)
BUN SERPL-MCNC: 31 MG/DL (ref 6–23)
CALCIUM SERPL-MCNC: 8.6 MG/DL (ref 8.6–10.3)
CHLORIDE SERPL-SCNC: 105 MMOL/L (ref 98–107)
CHOLEST SERPL-MCNC: 157 MG/DL (ref 0–199)
CHOLESTEROL/HDL RATIO: 2.8
CO2 SERPL-SCNC: 20 MMOL/L (ref 21–32)
CREAT SERPL-MCNC: 1.53 MG/DL (ref 0.5–1.3)
CREAT UR-MCNC: 129.6 MG/DL (ref 20–370)
EGFRCR SERPLBLD CKD-EPI 2021: 45 ML/MIN/1.73M*2
EOSINOPHIL # BLD AUTO: 0.37 X10*3/UL (ref 0–0.4)
EOSINOPHIL NFR BLD AUTO: 6.9 %
ERYTHROCYTE [DISTWIDTH] IN BLOOD BY AUTOMATED COUNT: 13.4 % (ref 11.5–14.5)
EST. AVERAGE GLUCOSE BLD GHB EST-MCNC: 212 MG/DL
GLUCOSE SERPL-MCNC: 333 MG/DL (ref 74–99)
HBA1C MFR BLD: 9 %
HCT VFR BLD AUTO: 42.9 % (ref 41–52)
HDLC SERPL-MCNC: 56 MG/DL
HGB BLD-MCNC: 13.7 G/DL (ref 13.5–17.5)
IMM GRANULOCYTES # BLD AUTO: 0.01 X10*3/UL (ref 0–0.5)
IMM GRANULOCYTES NFR BLD AUTO: 0.2 % (ref 0–0.9)
LDLC SERPL CALC-MCNC: 86 MG/DL
LYMPHOCYTES # BLD AUTO: 0.65 X10*3/UL (ref 0.8–3)
LYMPHOCYTES NFR BLD AUTO: 12 %
MCH RBC QN AUTO: 29 PG (ref 26–34)
MCHC RBC AUTO-ENTMCNC: 31.9 G/DL (ref 32–36)
MCV RBC AUTO: 91 FL (ref 80–100)
MICROALBUMIN UR-MCNC: 41.2 MG/L
MICROALBUMIN/CREAT UR: 31.8 UG/MG CREAT
MONOCYTES # BLD AUTO: 0.81 X10*3/UL (ref 0.05–0.8)
MONOCYTES NFR BLD AUTO: 15 %
NEUTROPHILS # BLD AUTO: 3.5 X10*3/UL (ref 1.6–5.5)
NEUTROPHILS NFR BLD AUTO: 64.8 %
NON HDL CHOLESTEROL: 101 MG/DL (ref 0–149)
NRBC BLD-RTO: 0 /100 WBCS (ref 0–0)
PLATELET # BLD AUTO: 273 X10*3/UL (ref 150–450)
POTASSIUM SERPL-SCNC: 5.1 MMOL/L (ref 3.5–5.3)
PROT SERPL-MCNC: 6.2 G/DL (ref 6.4–8.2)
RBC # BLD AUTO: 4.72 X10*6/UL (ref 4.5–5.9)
SODIUM SERPL-SCNC: 135 MMOL/L (ref 136–145)
T4 FREE SERPL-MCNC: 0.7 NG/DL (ref 0.61–1.12)
TRIGL SERPL-MCNC: 73 MG/DL (ref 0–149)
TSH SERPL-ACNC: 12.12 MIU/L (ref 0.44–3.98)
VIT B12 SERPL-MCNC: 415 PG/ML (ref 211–911)
VLDL: 15 MG/DL (ref 0–40)
WBC # BLD AUTO: 5.4 X10*3/UL (ref 4.4–11.3)

## 2024-01-17 PROCEDURE — 80053 COMPREHEN METABOLIC PANEL: CPT

## 2024-01-17 PROCEDURE — 36415 COLL VENOUS BLD VENIPUNCTURE: CPT

## 2024-01-17 PROCEDURE — 82570 ASSAY OF URINE CREATININE: CPT

## 2024-01-17 PROCEDURE — 80061 LIPID PANEL: CPT

## 2024-01-17 PROCEDURE — 83036 HEMOGLOBIN GLYCOSYLATED A1C: CPT

## 2024-01-17 PROCEDURE — 84439 ASSAY OF FREE THYROXINE: CPT

## 2024-01-17 PROCEDURE — 82043 UR ALBUMIN QUANTITATIVE: CPT

## 2024-01-17 PROCEDURE — 85025 COMPLETE CBC W/AUTO DIFF WBC: CPT

## 2024-01-17 PROCEDURE — 82306 VITAMIN D 25 HYDROXY: CPT

## 2024-01-17 PROCEDURE — 84443 ASSAY THYROID STIM HORMONE: CPT

## 2024-01-17 PROCEDURE — 82607 VITAMIN B-12: CPT

## 2024-01-17 RX ORDER — LEVOTHYROXINE SODIUM 25 UG/1
25 TABLET ORAL
Qty: 90 TABLET | Refills: 3 | Status: SHIPPED | OUTPATIENT
Start: 2024-01-17 | End: 2024-04-22 | Stop reason: ALTCHOICE

## 2024-01-17 RX ORDER — ERGOCALCIFEROL 1.25 MG/1
50000 CAPSULE ORAL
Qty: 12 CAPSULE | Refills: 1 | Status: SHIPPED | OUTPATIENT
Start: 2024-01-17 | End: 2024-04-10

## 2024-01-17 NOTE — PATIENT INSTRUCTIONS

## 2024-01-18 ENCOUNTER — TELEPHONE (OUTPATIENT)
Dept: PRIMARY CARE | Facility: CLINIC | Age: 84
End: 2024-01-18
Payer: MEDICARE

## 2024-01-18 NOTE — TELEPHONE ENCOUNTER
----- Message from Ki Browning DO sent at 1/17/2024  7:05 PM EST -----  Vitamin D levels very low at 15.  Start vitamin D 50,000 units weekly for 12 weeks  Also TSH very low at 12 hypothyroidism start levothyroxine 25 mcg daily in the morning on empty stomach reevaluate vitamin D thyroid levels in 3 months

## 2024-01-19 ENCOUNTER — OFFICE VISIT (OUTPATIENT)
Dept: PRIMARY CARE | Facility: CLINIC | Age: 84
End: 2024-01-19
Payer: MEDICARE

## 2024-01-19 ENCOUNTER — APPOINTMENT (OUTPATIENT)
Dept: PRIMARY CARE | Facility: CLINIC | Age: 84
End: 2024-01-19
Payer: MEDICARE

## 2024-01-19 VITALS
HEIGHT: 68 IN | BODY MASS INDEX: 36.37 KG/M2 | DIASTOLIC BLOOD PRESSURE: 70 MMHG | HEART RATE: 68 BPM | WEIGHT: 240 LBS | SYSTOLIC BLOOD PRESSURE: 110 MMHG

## 2024-01-19 DIAGNOSIS — E08.22 DIABETES MELLITUS DUE TO UNDERLYING CONDITION WITH STAGE 3A CHRONIC KIDNEY DISEASE, WITH LONG-TERM CURRENT USE OF INSULIN (MULTI): ICD-10-CM

## 2024-01-19 DIAGNOSIS — E10.69 DYSLIPIDEMIA DUE TO TYPE 1 DIABETES MELLITUS (MULTI): ICD-10-CM

## 2024-01-19 DIAGNOSIS — Z79.4 DIABETES MELLITUS DUE TO UNDERLYING CONDITION WITH STAGE 3A CHRONIC KIDNEY DISEASE, WITH LONG-TERM CURRENT USE OF INSULIN (MULTI): ICD-10-CM

## 2024-01-19 DIAGNOSIS — E03.9 HYPOTHYROIDISM (ACQUIRED): ICD-10-CM

## 2024-01-19 DIAGNOSIS — E78.5 DYSLIPIDEMIA DUE TO TYPE 1 DIABETES MELLITUS (MULTI): ICD-10-CM

## 2024-01-19 DIAGNOSIS — I11.0 HYPERTENSIVE HEART DISEASE WITH CHRONIC DIASTOLIC CONGESTIVE HEART FAILURE (MULTI): ICD-10-CM

## 2024-01-19 DIAGNOSIS — Z00.00 ROUTINE GENERAL MEDICAL EXAMINATION AT HEALTH CARE FACILITY: ICD-10-CM

## 2024-01-19 DIAGNOSIS — E10.649 TYPE 1 DIABETES MELLITUS WITH HYPOGLYCEMIA AND WITHOUT COMA (MULTI): ICD-10-CM

## 2024-01-19 DIAGNOSIS — N18.31 DIABETES MELLITUS DUE TO UNDERLYING CONDITION WITH STAGE 3A CHRONIC KIDNEY DISEASE, WITH LONG-TERM CURRENT USE OF INSULIN (MULTI): ICD-10-CM

## 2024-01-19 DIAGNOSIS — F33.42 DEPRESSION, MAJOR, RECURRENT, IN COMPLETE REMISSION (CMS-HCC): ICD-10-CM

## 2024-01-19 DIAGNOSIS — Z00.00 MEDICARE ANNUAL WELLNESS VISIT, SUBSEQUENT: Primary | ICD-10-CM

## 2024-01-19 DIAGNOSIS — R54 FRAILTY SYNDROME IN GERIATRIC PATIENT: ICD-10-CM

## 2024-01-19 DIAGNOSIS — I50.32 HYPERTENSIVE HEART DISEASE WITH CHRONIC DIASTOLIC CONGESTIVE HEART FAILURE (MULTI): ICD-10-CM

## 2024-01-19 DIAGNOSIS — E66.01 CLASS 2 SEVERE OBESITY DUE TO EXCESS CALORIES WITH SERIOUS COMORBIDITY AND BODY MASS INDEX (BMI) OF 36.0 TO 36.9 IN ADULT (MULTI): ICD-10-CM

## 2024-01-19 PROBLEM — E11.69 DYSLIPIDEMIA ASSOCIATED WITH TYPE 2 DIABETES MELLITUS (MULTI): Status: ACTIVE | Noted: 2023-06-01

## 2024-01-19 PROCEDURE — 1160F RVW MEDS BY RX/DR IN RCRD: CPT | Performed by: INTERNAL MEDICINE

## 2024-01-19 PROCEDURE — G0446 INTENS BEHAVE THER CARDIO DX: HCPCS | Performed by: INTERNAL MEDICINE

## 2024-01-19 PROCEDURE — G0447 BEHAVIOR COUNSEL OBESITY 15M: HCPCS | Performed by: INTERNAL MEDICINE

## 2024-01-19 PROCEDURE — G0444 DEPRESSION SCREEN ANNUAL: HCPCS | Performed by: INTERNAL MEDICINE

## 2024-01-19 PROCEDURE — 99214 OFFICE O/P EST MOD 30 MIN: CPT | Performed by: INTERNAL MEDICINE

## 2024-01-19 PROCEDURE — 1170F FXNL STATUS ASSESSED: CPT | Performed by: INTERNAL MEDICINE

## 2024-01-19 PROCEDURE — 1159F MED LIST DOCD IN RCRD: CPT | Performed by: INTERNAL MEDICINE

## 2024-01-19 PROCEDURE — G0439 PPPS, SUBSEQ VISIT: HCPCS | Performed by: INTERNAL MEDICINE

## 2024-01-19 PROCEDURE — 1036F TOBACCO NON-USER: CPT | Performed by: INTERNAL MEDICINE

## 2024-01-19 PROCEDURE — 99497 ADVNCD CARE PLAN 30 MIN: CPT | Performed by: INTERNAL MEDICINE

## 2024-01-19 PROCEDURE — 99397 PER PM REEVAL EST PAT 65+ YR: CPT | Performed by: INTERNAL MEDICINE

## 2024-01-19 PROCEDURE — G0442 ANNUAL ALCOHOL SCREEN 15 MIN: HCPCS | Performed by: INTERNAL MEDICINE

## 2024-01-19 ASSESSMENT — ANXIETY QUESTIONNAIRES
5. BEING SO RESTLESS THAT IT IS HARD TO SIT STILL: NOT AT ALL
IF YOU CHECKED OFF ANY PROBLEMS ON THIS QUESTIONNAIRE, HOW DIFFICULT HAVE THESE PROBLEMS MADE IT FOR YOU TO DO YOUR WORK, TAKE CARE OF THINGS AT HOME, OR GET ALONG WITH OTHER PEOPLE: NOT DIFFICULT AT ALL
2. NOT BEING ABLE TO STOP OR CONTROL WORRYING: NOT AT ALL
1. FEELING NERVOUS, ANXIOUS, OR ON EDGE: NOT AT ALL
6. BECOMING EASILY ANNOYED OR IRRITABLE: NOT AT ALL
GAD7 TOTAL SCORE: 0
7. FEELING AFRAID AS IF SOMETHING AWFUL MIGHT HAPPEN: NOT AT ALL
4. TROUBLE RELAXING: NOT AT ALL
3. WORRYING TOO MUCH ABOUT DIFFERENT THINGS: NOT AT ALL

## 2024-01-19 ASSESSMENT — ACTIVITIES OF DAILY LIVING (ADL)
DOING_HOUSEWORK: INDEPENDENT
DRESSING: INDEPENDENT
BATHING: INDEPENDENT
TAKING_MEDICATION: INDEPENDENT
GROCERY_SHOPPING: INDEPENDENT
MANAGING_FINANCES: INDEPENDENT

## 2024-01-19 ASSESSMENT — PATIENT HEALTH QUESTIONNAIRE - PHQ9
SUM OF ALL RESPONSES TO PHQ9 QUESTIONS 1 AND 2: 0
1. LITTLE INTEREST OR PLEASURE IN DOING THINGS: NOT AT ALL
2. FEELING DOWN, DEPRESSED OR HOPELESS: NOT AT ALL

## 2024-01-19 ASSESSMENT — ENCOUNTER SYMPTOMS
DEPRESSION: 0
OCCASIONAL FEELINGS OF UNSTEADINESS: 0
LOSS OF SENSATION IN FEET: 0

## 2024-01-19 NOTE — ASSESSMENT & PLAN NOTE
[place] [facility]NCOUN[facility]          Pt Name: Ericka Avila  MRN: [id number]  [place] 1971  Date of evaluation: 9/15/2022  Treating Resident Physician: Kerline Watters MD  Supervising Physician: Dr. Romain Aburto, [facility]       Chief Complaint   Patient presents with    Eye Problem     Redness pain \"after cleaning up warehouse\"     History obtained from the patient. HISTORY OF PRESENT ILLNESS    HPI  Ericka Avila is a 49 y.o. male who presents to the emergency department for evaluation of eye pain. Patient states that he works as a  and on Monday he was cleaning up a warehouse. He states that he began having some right eye pain on Monday. He states over the past few days it is gotten worse. He states his eye is now red very painful and is sensitive to light. Denies any nausea or vomiting. Denies any headache. Denies any fevers or chills. States that he has a little bit of blurry vision. States that he wears glasses all the time. Denies any contacts. He states he is unsure what happened but he thinks something may have gotten into it. Patient denies any new Headache, Fever, Chills, Cough, Chest pain, Shortness of breath, Abdominal pain, Nausea, Vomiting, Diarrhea, Constipation, and Leg swelling. The patient has no other acute complaints at this time. REVIEW OF SYSTEMS   Review of Systems   Constitutional:  Negative for chills and fever. HENT:  Negative for ear pain and sinus pain. Eyes:  Positive for photophobia, pain, redness and visual disturbance. Respiratory:  Negative for cough and shortness of breath. Cardiovascular:  Negative for chest pain and leg swelling. Gastrointestinal:  Negative for abdominal pain, constipation, diarrhea, nausea and vomiting. Genitourinary:  Negative for dysuria and flank pain. Musculoskeletal:  Negative for back pain and neck pain.    Skin: Reevaluate thyroid function with TSH of 12 started on levothyroxine 25 mcg daily reevaluate in 3 months   Negative for wound. Neurological:  Negative for headaches. Psychiatric/Behavioral:  Negative for confusion. PAST MEDICAL AND SURGICAL HISTORY     Past Medical History:   Diagnosis Date    Asthma 1984    COVID     MI, old 2012     Past Surgical History:   Procedure Laterality Date    [address] [facility]  2012    Per Pt \"no stent\"     CERVICAL SPINE SURGERY  2009    Dr. Ezequiel Al  2008    benign tumor         MEDICATIONS   No current facility-administered medications for this encounter. Current Outpatient Medications:     LORazepam (ATIVAN) 2 MG tablet, Take 2 mg by mouth every 6 hours as needed for Anxiety. , Disp: , Rfl:     Baclofen 10 MG PACK, Take by mouth, Disp: , Rfl:     ciprofloxacin (CILOXAN) 0.3 % ophthalmic solution, Place 2 drops into the right eye See Admin Instructions for 14 days On day1: Use 2 drops every 15 minutes for 6 hours then 2 drops every 30 minutes for the rest of the day. On the second day: 2 drops/hour On days 3 through 14: use 2 drops every 4 hours, Disp: 1 each, Rfl: 0    prednisoLONE acetate (PRED FORTE) 1 % ophthalmic suspension, Place 1 drop into the right eye 4 times daily for 3 days, Disp: 1 each, Rfl: 0    ascorbic acid (VITAMIN C) 500 MG tablet, Take by mouth, Disp: , Rfl:     ELDERBERRY PO, Take by mouth, Disp: , Rfl:     vitamin D 25 MCG (1000 UT) CAPS, Take by mouth, Disp: , Rfl:     meloxicam (MOBIC) 15 MG tablet, Take 1 tablet by mouth daily as needed for Pain, Disp: 30 tablet, Rfl: 2    aspirin EC 81 MG EC tablet, Take 1 tablet by mouth daily. , Disp: 30 tablet, Rfl: 3      SOCIAL HISTORY     Social History     Social History Narrative    Not on file     Social History     Tobacco Use    Smoking status: Former     Packs/day: 1.50     Years: 30.00     Pack years: 45.00     Types: Cigarettes     Quit date: 2021     Years since quittin.6    Smokeless tobacco: Never    Tobacco comments:     printed to avs Vaping Use    Vaping Use: Never used   Substance Use Topics    Alcohol use: No    Drug use: No         ALLERGIES     Allergies   Allergen Reactions    Morphine Rash     Other reaction(s): Unknown         FAMILY HISTORY     Family History   Problem Relation Age of Onset    Arthritis Mother     Alcohol Abuse Father     COPD Father 62    Hypertension Father 54    Cancer Father         Unsure    Cancer Maternal Aunt 61        lung     Cancer Paternal Aunt     Parkinsonism Paternal Uncle     Cancer Maternal Grandfather 61    Heart Attack Paternal Grandfather     Heart Disease Paternal Grandfather     Coronary Art Dis Paternal Grandfather     Stroke Paternal Aunt     Cancer Paternal Aunt     Alzheimer's Disease Paternal Aunt     Cancer Paternal Uncle     Other Paternal Uncle         Lung Infection     Diabetes Paternal Uncle          PREVIOUS RECORDS   Previous records reviewed:  Patient was seen last earlier today at urgent care for similar complaint . PHYSICAL EXAM     ED Triage Vitals [09/15/22 1820]   BP Temp Temp src Heart Rate Resp SpO2 Height Weight   (!) 176/90 98.2 °F (36.8 °C) -- 81 16 98 % 5' 7\" (1.702 m) 202 lb 12.8 oz (92 kg)     Initial vital signs and nursing assessment reviewed and vitals are/show: Afebrile, Hypertensive, Normocardic, and Normal RR. Pulsoximetry is normal per my interpretation. Additional Vital Signs:  Vitals:    09/15/22 2035   BP: (!) 145/85   Pulse: 88   Resp: 17   Temp:    SpO2: 99%       Physical Exam  Constitutional:       General: He is not in acute distress. Appearance: Normal appearance. He is obese. He is not ill-appearing, toxic-appearing or diaphoretic. HENT:      Head: Normocephalic and atraumatic. Right Ear: External ear normal.      Left Ear: External ear normal.   Eyes:      General: No scleral icterus. Right eye: No discharge. Left eye: No discharge. Extraocular Movements: Extraocular movements intact.       Pupils: Pupils are equal, round, and reactive to light. Comments: Erythematous conjunctiva on the right  No apparent limbic sparing  Worse with movement  On Woods lamp examination there was no ulcer or Iam sign there was possibly some minor abrasions to the inferior aspect of his eye     Cardiovascular:      Rate and Rhythm: Normal rate and regular rhythm. Pulmonary:      Effort: Pulmonary effort is normal. No respiratory distress. Breath sounds: Normal breath sounds. No stridor. No wheezing, rhonchi or rales. Chest:      Chest wall: No tenderness. Abdominal:      General: Abdomen is flat. There is no distension. Palpations: Abdomen is soft. Tenderness: There is no abdominal tenderness. There is no guarding or rebound. Musculoskeletal:      Cervical back: Neck supple. Right lower leg: No edema. Left lower leg: No edema. Skin:     General: Skin is warm and dry. Neurological:      Mental Status: He is alert and oriented to person, place, and time. Mental status is at baseline. Psychiatric:         Mood and Affect: Mood normal.         Behavior: Behavior normal.         Thought Content: Thought content normal.         Judgment: Judgment normal.           MEDICAL DECISION MAKING   Initial Assessment:     72-year-old male presenting to the ED for painful right eye    Differential diagnoses include but not limited to: Corneal abrasion corneal ulcer perforation laceration uveitis episcleritis conjunctivitis     Plan:       Visual acuity  Woods lamp examination  Discharge          Patient is a 49 y.o. male who was seen and evaluated in the emergency department for painful red eye. His vitals are stable. His history and examination was suggestive of anterior uveitis. He also may have had a minor abrasion on the inferior aspect of his eye seen on Woods lamp. After discussion with patient, he stated he would likely be able to achieve next day ophthalmology follow-up.   I stressed the importance of ophthalmology follow-up within 24 hours and he verbalized understanding and agreement. I discharged him with topical antibiotics and 3 days worth of topical steroids. Patient discharged. ED RESULTS   Laboratory results:  Labs Reviewed - No data to display    Radiologic studies results:  No orders to display       ED Medications administered this visit:   Medications   proparacaine (ALCAINE) 0.5 % ophthalmic solution 1 drop (1 drop Both Eyes Given 9/15/22 2013)         ED COURSE         Strict return precautions and follow up instructions were discussed with the patient prior to discharge, with which the patient agrees. MEDICATION CHANGES     Discharge Medication List as of 9/15/2022  9:24 PM        START taking these medications    Details   prednisoLONE acetate (PRED FORTE) 1 % ophthalmic suspension Place 1 drop into the right eye 4 times daily for 3 days, Disp-1 each, R-0Print               FINAL DISPOSITION     Final diagnoses:   Anterior uveitis     Condition: condition: stable  Dispo: Discharge to home      This transcription was electronically signed. Parts of this transcriptions may have been dictated by use of voice recognition software and electronically transcribed, and parts may have been transcribed with the assistance of an ED scribe. The transcription may contain errors not detected in proofreading. Please refer to my supervising physician's documentation if my documentation differs.     Electronically Signed: Janelle Mina MD, 09/15/22, 10:00 PM         Janelle Mina MD  Resident  09/15/22 0748

## 2024-01-19 NOTE — PROGRESS NOTES
"Subjective   Reason for Visit: Juanjose Douglass is an 83 y.o. male here for a Medicare Wellness visit.     Past Medical, Surgical, and Family History reviewed and updated in chart.    Reviewed all medications by prescribing practitioner or clinical pharmacist (such as prescriptions, OTCs, herbal therapies and supplements) and documented in the medical record.    HPI    Patient Care Team:  Ki Browning DO as PCP - General  Ki Browning DO as PCP - [employer] PCP     Review of Systems   All other systems reviewed and are negative.      Objective   Vitals:  /70   Pulse 68   Ht 1.727 m (5' 8\")   Wt 109 kg (240 lb)   BMI 36.49 kg/m²       Physical Exam  Vitals and nursing note reviewed.   Constitutional:       General: He is not in acute distress.     Appearance: Normal appearance. He is well-developed. He is obese. He is not toxic-appearing.   HENT:      Head: Normocephalic and atraumatic.      Right Ear: Tympanic membrane and external ear normal.      Left Ear: Tympanic membrane and external ear normal.      Nose: Nose normal.      Mouth/Throat:      Mouth: Mucous membranes are moist.      Pharynx: Oropharynx is clear. No oropharyngeal exudate or posterior oropharyngeal erythema.      Tonsils: No tonsillar exudate. 2+ on the right. 2+ on the left.   Eyes:      Extraocular Movements: Extraocular movements intact.      Conjunctiva/sclera: Conjunctivae normal.   Cardiovascular:      Rate and Rhythm: Normal rate and regular rhythm.      Pulses: Normal pulses.      Heart sounds: Normal heart sounds. No murmur heard.  Pulmonary:      Effort: Pulmonary effort is normal.      Breath sounds: Normal breath sounds.   Abdominal:      General: Abdomen is flat. Bowel sounds are normal.      Palpations: Abdomen is soft.   Musculoskeletal:      Cervical back: Neck supple.   Feet:      Right foot:      Skin integrity: Skin integrity normal. No ulcer, blister, skin breakdown, erythema, warmth or " callus.      Toenail Condition: Right toenails are normal.      Left foot:      Skin integrity: Skin integrity normal. No ulcer, blister, skin breakdown, erythema, warmth or callus.      Toenail Condition: Left toenails are normal.   Lymphadenopathy:      Cervical: No cervical adenopathy.   Skin:     General: Skin is warm and dry.      Capillary Refill: Capillary refill takes more than 3 seconds.      Findings: No rash.   Neurological:      Mental Status: He is alert. Mental status is at baseline.      Sensory: Sensation is intact.   Psychiatric:         Mood and Affect: Mood normal.         Behavior: Behavior normal.         Thought Content: Thought content normal.         Judgment: Judgment normal.         Assessment/Plan   Problem List Items Addressed This Visit       Depression, major, recurrent, in complete remission (CMS/[facility])    Current Assessment & Plan     Stable continue [id number] mg daily with buspirone 30 mg twice a day         Relevant Orders    Follow Up In [facility] - PCP - Established    Basic metabolic panel    Hemoglobin A1C    Dyslipidemia due to type 1 diabetes mellitus (CMS/[facility])    Current Assessment & Plan     Optimal LDL cholesterol  of 86 continue rosuvastatin 40 mg daily         Relevant Orders    Follow Up In [facility] - PCP - Established    Basic metabolic panel    Hemoglobin A1C    Frailty syndrome in geriatric patient    Current Assessment & Plan     Improvements in overall functioning continue to follow closely         Relevant Orders    Follow Up In [facility] - PCP - Established    Basic metabolic panel    Hemoglobin A1C    Diabetes 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 3a chronic kidney disease, with long-term current use of insulin (CMS/[facility])    Current Assessment & Plan     GFR stable at 45 creatinine of 1.5 stable potassium 5.1 very mild microalbuminuria of 31 consider adding Farxiga or Jardiance to patient's plan of care reevaluate  with next visit         Relevant Orders    Follow Up In [facility] - PCP - Established    Basic metabolic panel    Hemoglobin A1C    Type 1 diabetes mellitus with hypoglycemia and without coma (CMS/[facility])    Current Assessment & Plan     Hemoglobin A1c improving at 9.0 with use of Dexcom to reduce nocturnal hypoglycemia reduce short acting Humalog coverage with meals to 40 units with meals continue Toujeo insulin at 46 units daily         Relevant Orders    Follow Up In [facility] - PCP - Established    Basic metabolic panel    Hemoglobin A1C    Class 2 severe obesity due to excess calories with serious comorbidity and body mass index (BMI) of 36.0 to 36.9 in adult (CMS/[facility])    Overview     Continue to work with regular exercise and reduce consumption of high processed foods optimizing A1c goal less than 9.0 while avoiding unnecessary hypoglycemia secondary to age and frailty         Current Assessment & Plan     Initiate medical therapy with levothyroxine to treat hypothyroidism and improve metabolism along with diet exercise lifestyle changes reevaluate next visit         Relevant Orders    Follow Up In [facility] - PCP - Established    Basic metabolic panel    Hemoglobin A1C    Hypertensive heart disease with chronic diastolic congestive heart failure (CMS/[facility])    Current Assessment & Plan     Well compensated continue carvedilol 12.5 mg daily withLisinopril hydrochlorothiazide 20/25 1 tablet daily         Relevant Orders    Follow Up In [facility] - PCP - Established    Basic metabolic panel    Hemoglobin A1C    Hypothyroidism (acquired)    Current Assessment & Plan     Reevaluate thyroid function with TSH of 12 started on levothyroxine 25 mcg daily reevaluate in 3 months         Medicare annual wellness visit, subsequent - Primary    Current Assessment & Plan     Up-to-date with vaccinations and screenings based on age discussed advanced medical directives and  improvements to treat overall morbid obesity         Relevant Orders    Follow Up In [facility] - PCP - Established    Basic metabolic panel    Hemoglobin A1C     Other Visit Diagnoses       Routine general medical examination at health care facility

## 2024-01-19 NOTE — ASSESSMENT & PLAN NOTE
Well compensated continue carvedilol 12.5 mg daily withLisinopril hydrochlorothiazide 20/25 1 tablet daily

## 2024-01-19 NOTE — ASSESSMENT & PLAN NOTE
Up-to-date with vaccinations and screenings based on age discussed advanced medical directives and improvements to treat overall morbid obesity

## 2024-01-19 NOTE — PROGRESS NOTES
"Subjective   Reason for Visit: Juanjose Douglass is an 83 y.o. male here for a Medicare Wellness visit.          Reviewed all medications by prescribing practitioner or clinical pharmacist (such as prescriptions, OTCs, herbal therapies and supplements) and documented in the medical record.    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

## 2024-01-19 NOTE — PROGRESS NOTES
Alcohol consumption becomes hazardous when consuming women [place] over 65 years old greater than 7 drinks per week or greater than 3 drinks per occasion for men greater than 14 drinks per week or greater than 4 drinks per occasion.  I spent 15 minutes screening for alcohol use.Depression and anxiety screening completed   PHQ9 score   GAD7 score   I spent 15 minutes obtaining and discussing depression screening using PHQ 2 questions with results documented in chart.  Screening using PHQ-9 and SONJA-7 scores were used for follow-up with treatment and referral plan discussed.      I spent greater than 15 minutes face-to-face with individual providing recommendations for nutrition choices and exercise plan to help achieve weight reductionI spent 15 minutes face-to-face with this individual discussing the cardiovascular risk and behavioral therapies of nutritional choices exercise and elimination of habits contributing to risk .We agreed on a plan how they may be able to reduce  current cardiovascular risk.  Per patient with calculation greater than 10% aspirin use was discussed and encouraged unless known allergy or increased risk of bleeding contraindicates use patient's 10-year CV risk estimate calculates:I spent greater than 15 minutes discussing advance care planning including the explanation and discussion of advanced directives.  If patient does not have current up-to-date documents examples and information provided on how to create both living will and power of . [facility] toolkit was given to patient and was encouraged to work out completing these documents.Subjective   Reason for Visit: Juanjose Douglass is an 83 y.o. male here for a Medicare Wellness visit.     Past Medical, Surgical, and Family History reviewed and updated in chart.    Reviewed all medications by prescribing practitioner or clinical pharmacist (such as prescriptions, OTCs, herbal therapies and supplements) and documented in the medical  "record.    HPI    Patient Care Team:  Ki Browning DO as PCP - General  Ki Browning DO as PCP - [employer] PCP     Review of Systems    Objective   Vitals:  /70   Pulse 68   Ht 1.727 m (5' 8\")   Wt 109 kg (240 lb)   BMI 36.49 kg/m²       Physical Exam    Assessment/Plan   Problem List Items Addressed This Visit       Depression, major, recurrent, in complete remission (CMS/[facility])    Current Assessment & Plan     Stable continue [id number] mg daily with buspirone 30 mg twice a day         Relevant Orders    Follow Up In [facility] - PCP - Established    Basic metabolic panel    Hemoglobin A1C    Dyslipidemia due to type 1 diabetes mellitus (CMS/[facility])    Current Assessment & Plan     Optimal LDL cholesterol  of 86 continue rosuvastatin 40 mg daily         Relevant Orders    Follow Up In [facility] - PCP - Established    Basic metabolic panel    Hemoglobin A1C    Frailty syndrome in geriatric patient    Current Assessment & Plan     Improvements in overall functioning continue to follow closely         Relevant Orders    Follow Up In [facility] - PCP - Established    Basic metabolic panel    Hemoglobin A1C    Diabetes 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 3a chronic kidney disease, with long-term current use of insulin (CMS/[facility])    Current Assessment & Plan     GFR stable at 45 creatinine of 1.5 stable potassium 5.1 very mild microalbuminuria of 31 consider adding Farxiga or Jardiance to patient's plan of care reevaluate with next visit         Relevant Orders    Follow Up In [facility] - PCP - Established    Basic metabolic panel    Hemoglobin A1C    Type 1 diabetes mellitus with hypoglycemia and without coma (CMS/[facility])    Current Assessment & Plan     Hemoglobin A1c improving at 9.0 with use of Dexcom to reduce nocturnal hypoglycemia reduce short acting Humalog coverage with meals to 40 units with meals continue Toujeo insulin " at 46 units daily         Relevant Orders    Follow Up In [facility] - PCP - Established    Basic metabolic panel    Hemoglobin A1C    Class 2 severe obesity due to excess calories with serious comorbidity and body mass index (BMI) of 36.0 to 36.9 in adult (CMS/[facility])    Overview     Continue to work with regular exercise and reduce consumption of high processed foods optimizing A1c goal less than 9.0 while avoiding unnecessary hypoglycemia secondary to age and frailty         Current Assessment & Plan     Initiate medical therapy with levothyroxine to treat hypothyroidism and improve metabolism along with diet exercise lifestyle changes reevaluate next visit         Relevant Orders    Follow Up In [facility] - PCP - Established    Basic metabolic panel    Hemoglobin A1C    Hypertensive heart disease with chronic diastolic congestive heart failure (CMS/[facility])    Current Assessment & Plan     Well compensated continue carvedilol 12.5 mg daily withLisinopril hydrochlorothiazide 20/25 1 tablet daily         Relevant Orders    Follow Up In [facility] - PCP - Established    Basic metabolic panel    Hemoglobin A1C    Hypothyroidism (acquired)    Current Assessment & Plan     Reevaluate thyroid function with TSH of 12 started on levothyroxine 25 mcg daily reevaluate in 3 months         Medicare annual wellness visit, subsequent - Primary    Current Assessment & Plan     Up-to-date with vaccinations and screenings based on age discussed advanced medical directives and improvements to treat overall morbid obesity         Relevant Orders    Follow Up In [facility] - PCP - Established    Basic metabolic panel    Hemoglobin A1C     Other Visit Diagnoses       Routine general medical examination at health care facility

## 2024-01-19 NOTE — ASSESSMENT & PLAN NOTE
Initiate medical therapy with levothyroxine to treat hypothyroidism and improve metabolism along with diet exercise lifestyle changes reevaluate next visit

## 2024-01-19 NOTE — ASSESSMENT & PLAN NOTE
GFR stable at 45 creatinine of 1.5 stable potassium 5.1 very mild microalbuminuria of 31 consider adding Farxiga or Jardiance to patient's plan of care reevaluate with next visit

## 2024-02-13 DIAGNOSIS — E10.649 TYPE 1 DIABETES MELLITUS WITH HYPOGLYCEMIA AND WITHOUT COMA (MULTI): ICD-10-CM

## 2024-02-13 RX ORDER — INSULIN GLARGINE 300 U/ML
INJECTION, SOLUTION SUBCUTANEOUS
Qty: 18 ML | Refills: 3 | Status: SHIPPED | OUTPATIENT
Start: 2024-02-13

## 2024-03-14 DIAGNOSIS — E10.649 TYPE 1 DIABETES MELLITUS WITH HYPOGLYCEMIA AND WITHOUT COMA (MULTI): ICD-10-CM

## 2024-03-14 RX ORDER — INSULIN ASPART 100 [IU]/ML
INJECTION, SOLUTION INTRAVENOUS; SUBCUTANEOUS
Qty: 15 ML | Refills: 3 | Status: SHIPPED | OUTPATIENT
Start: 2024-03-14

## 2024-04-18 ENCOUNTER — LAB (OUTPATIENT)
Dept: LAB | Facility: LAB | Age: 84
End: 2024-04-18
Payer: MEDICARE

## 2024-04-18 DIAGNOSIS — E66.01 CLASS 2 SEVERE OBESITY DUE TO EXCESS CALORIES WITH SERIOUS COMORBIDITY AND BODY MASS INDEX (BMI) OF 36.0 TO 36.9 IN ADULT (MULTI): ICD-10-CM

## 2024-04-18 DIAGNOSIS — R54 FRAILTY SYNDROME IN GERIATRIC PATIENT: ICD-10-CM

## 2024-04-18 DIAGNOSIS — N18.31 DIABETES MELLITUS DUE TO UNDERLYING CONDITION WITH STAGE 3A CHRONIC KIDNEY DISEASE, WITH LONG-TERM CURRENT USE OF INSULIN (MULTI): ICD-10-CM

## 2024-04-18 DIAGNOSIS — E10.69 DYSLIPIDEMIA DUE TO TYPE 1 DIABETES MELLITUS (MULTI): ICD-10-CM

## 2024-04-18 DIAGNOSIS — I50.32 HYPERTENSIVE HEART DISEASE WITH CHRONIC DIASTOLIC CONGESTIVE HEART FAILURE (MULTI): ICD-10-CM

## 2024-04-18 DIAGNOSIS — Z00.00 MEDICARE ANNUAL WELLNESS VISIT, SUBSEQUENT: ICD-10-CM

## 2024-04-18 DIAGNOSIS — E78.5 DYSLIPIDEMIA DUE TO TYPE 1 DIABETES MELLITUS (MULTI): ICD-10-CM

## 2024-04-18 DIAGNOSIS — E08.22 DIABETES MELLITUS DUE TO UNDERLYING CONDITION WITH STAGE 3A CHRONIC KIDNEY DISEASE, WITH LONG-TERM CURRENT USE OF INSULIN (MULTI): ICD-10-CM

## 2024-04-18 DIAGNOSIS — F33.42 DEPRESSION, MAJOR, RECURRENT, IN COMPLETE REMISSION (CMS-HCC): ICD-10-CM

## 2024-04-18 DIAGNOSIS — Z79.4 DIABETES MELLITUS DUE TO UNDERLYING CONDITION WITH STAGE 3A CHRONIC KIDNEY DISEASE, WITH LONG-TERM CURRENT USE OF INSULIN (MULTI): ICD-10-CM

## 2024-04-18 DIAGNOSIS — E55.9 VITAMIN D DEFICIENCY: ICD-10-CM

## 2024-04-18 DIAGNOSIS — E10.649 TYPE 1 DIABETES MELLITUS WITH HYPOGLYCEMIA AND WITHOUT COMA (MULTI): ICD-10-CM

## 2024-04-18 DIAGNOSIS — I11.0 HYPERTENSIVE HEART DISEASE WITH CHRONIC DIASTOLIC CONGESTIVE HEART FAILURE (MULTI): ICD-10-CM

## 2024-04-18 DIAGNOSIS — E03.9 HYPOTHYROIDISM (ACQUIRED): ICD-10-CM

## 2024-04-18 LAB
25(OH)D3 SERPL-MCNC: 35 NG/ML (ref 30–100)
ANION GAP SERPL CALC-SCNC: 14 MMOL/L (ref 10–20)
BUN SERPL-MCNC: 32 MG/DL (ref 6–23)
CALCIUM SERPL-MCNC: 8.8 MG/DL (ref 8.6–10.3)
CHLORIDE SERPL-SCNC: 109 MMOL/L (ref 98–107)
CO2 SERPL-SCNC: 20 MMOL/L (ref 21–32)
CREAT SERPL-MCNC: 1.69 MG/DL (ref 0.5–1.3)
EGFRCR SERPLBLD CKD-EPI 2021: 40 ML/MIN/1.73M*2
EST. AVERAGE GLUCOSE BLD GHB EST-MCNC: 223 MG/DL
GLUCOSE SERPL-MCNC: 211 MG/DL (ref 74–99)
HBA1C MFR BLD: 9.4 %
POTASSIUM SERPL-SCNC: 5 MMOL/L (ref 3.5–5.3)
SODIUM SERPL-SCNC: 138 MMOL/L (ref 136–145)
T4 FREE SERPL-MCNC: 0.74 NG/DL (ref 0.61–1.12)
TSH SERPL-ACNC: 5.03 MIU/L (ref 0.44–3.98)

## 2024-04-18 PROCEDURE — 84443 ASSAY THYROID STIM HORMONE: CPT

## 2024-04-18 PROCEDURE — 84439 ASSAY OF FREE THYROXINE: CPT

## 2024-04-18 PROCEDURE — 80048 BASIC METABOLIC PNL TOTAL CA: CPT

## 2024-04-18 PROCEDURE — 36415 COLL VENOUS BLD VENIPUNCTURE: CPT

## 2024-04-18 PROCEDURE — 82306 VITAMIN D 25 HYDROXY: CPT

## 2024-04-18 PROCEDURE — 83036 HEMOGLOBIN GLYCOSYLATED A1C: CPT

## 2024-04-22 ENCOUNTER — OFFICE VISIT (OUTPATIENT)
Dept: PRIMARY CARE | Facility: CLINIC | Age: 84
End: 2024-04-22
Payer: MEDICARE

## 2024-04-22 VITALS
HEIGHT: 68 IN | WEIGHT: 241 LBS | DIASTOLIC BLOOD PRESSURE: 70 MMHG | HEART RATE: 66 BPM | SYSTOLIC BLOOD PRESSURE: 110 MMHG | BODY MASS INDEX: 36.53 KG/M2

## 2024-04-22 DIAGNOSIS — I11.0 HYPERTENSIVE HEART DISEASE WITH CHRONIC DIASTOLIC CONGESTIVE HEART FAILURE (MULTI): ICD-10-CM

## 2024-04-22 DIAGNOSIS — E10.69 DYSLIPIDEMIA DUE TO TYPE 1 DIABETES MELLITUS (MULTI): ICD-10-CM

## 2024-04-22 DIAGNOSIS — I50.32 HYPERTENSIVE HEART DISEASE WITH CHRONIC DIASTOLIC CONGESTIVE HEART FAILURE (MULTI): ICD-10-CM

## 2024-04-22 DIAGNOSIS — E66.01 CLASS 2 SEVERE OBESITY DUE TO EXCESS CALORIES WITH SERIOUS COMORBIDITY AND BODY MASS INDEX (BMI) OF 36.0 TO 36.9 IN ADULT (MULTI): ICD-10-CM

## 2024-04-22 DIAGNOSIS — E08.22 DIABETES MELLITUS DUE TO UNDERLYING CONDITION WITH STAGE 3A CHRONIC KIDNEY DISEASE, WITH LONG-TERM CURRENT USE OF INSULIN (MULTI): ICD-10-CM

## 2024-04-22 DIAGNOSIS — Z79.4 DIABETES MELLITUS DUE TO UNDERLYING CONDITION WITH STAGE 3A CHRONIC KIDNEY DISEASE, WITH LONG-TERM CURRENT USE OF INSULIN (MULTI): ICD-10-CM

## 2024-04-22 DIAGNOSIS — R54 FRAILTY SYNDROME IN GERIATRIC PATIENT: ICD-10-CM

## 2024-04-22 DIAGNOSIS — E78.5 DYSLIPIDEMIA DUE TO TYPE 1 DIABETES MELLITUS (MULTI): ICD-10-CM

## 2024-04-22 DIAGNOSIS — F33.42 DEPRESSION, MAJOR, RECURRENT, IN COMPLETE REMISSION (CMS-HCC): ICD-10-CM

## 2024-04-22 DIAGNOSIS — E03.9 HYPOTHYROIDISM (ACQUIRED): ICD-10-CM

## 2024-04-22 DIAGNOSIS — E10.649 TYPE 1 DIABETES MELLITUS WITH HYPOGLYCEMIA AND WITHOUT COMA (MULTI): Primary | ICD-10-CM

## 2024-04-22 DIAGNOSIS — N18.31 DIABETES MELLITUS DUE TO UNDERLYING CONDITION WITH STAGE 3A CHRONIC KIDNEY DISEASE, WITH LONG-TERM CURRENT USE OF INSULIN (MULTI): ICD-10-CM

## 2024-04-22 PROCEDURE — 1159F MED LIST DOCD IN RCRD: CPT | Performed by: INTERNAL MEDICINE

## 2024-04-22 PROCEDURE — 1160F RVW MEDS BY RX/DR IN RCRD: CPT | Performed by: INTERNAL MEDICINE

## 2024-04-22 PROCEDURE — 99214 OFFICE O/P EST MOD 30 MIN: CPT | Performed by: INTERNAL MEDICINE

## 2024-04-22 PROCEDURE — 1036F TOBACCO NON-USER: CPT | Performed by: INTERNAL MEDICINE

## 2024-04-22 RX ORDER — LEVOTHYROXINE SODIUM 50 UG/1
50 TABLET ORAL
Qty: 90 TABLET | Refills: 3 | Status: SHIPPED | OUTPATIENT
Start: 2024-04-22 | End: 2025-04-22

## 2024-04-22 RX ORDER — ERGOCALCIFEROL 1.25 MG/1
1 CAPSULE ORAL
COMMUNITY
Start: 2024-04-13

## 2024-04-22 NOTE — PROGRESS NOTES
"Subjective   Patient ID: Juanjose Douglass is a 83 y.o. male who presents for Follow-up.    HPI     Review of Systems    Objective   /70 (BP Location: Right arm, Patient Position: Sitting)   Pulse 66   Ht 1.727 m (5' 8\")   Wt 109 kg (241 lb)   BMI 36.64 kg/m²     Physical Exam    Assessment/Plan          "

## 2024-04-22 NOTE — PROGRESS NOTES
"Subjective   Reason for Visit: Juanjose Douglass is an 83 y.o. male here for a  follow-up diabetes visit.     Past Medical, Surgical, and Family History reviewed and updated in chart.    Reviewed all medications by prescribing practitioner or clinical pharmacist (such as prescriptions, OTCs, herbal therapies and supplements) and documented in the medical record.    HPI    Patient Care Team:  Ki Browning DO as PCP - General  Ki Browning DO as PCP - [employer] PCP     Review of Systems   All other systems reviewed and are negative.      Objective   Vitals:  /70 (BP Location: Right arm, Patient Position: Sitting)   Pulse 66   Ht 1.727 m (5' 8\")   Wt 109 kg (241 lb)   BMI 36.64 kg/m²       Physical Exam  Vitals and nursing note reviewed.   Constitutional:       General: He is not in acute distress.     Appearance: Normal appearance. He is well-developed. He is not toxic-appearing.   HENT:      Head: Normocephalic and atraumatic.      Right Ear: Tympanic membrane and external ear normal.      Left Ear: Tympanic membrane and external ear normal.      Nose: Nose normal.      Mouth/Throat:      Mouth: Mucous membranes are moist.      Pharynx: Oropharynx is clear. No oropharyngeal exudate or posterior oropharyngeal erythema.      Tonsils: No tonsillar exudate. 2+ on the right. 2+ on the left.   Eyes:      Extraocular Movements: Extraocular movements intact.      Conjunctiva/sclera: Conjunctivae normal.   Cardiovascular:      Rate and Rhythm: Normal rate and regular rhythm.      Pulses: Normal pulses.      Heart sounds: Normal heart sounds. No murmur heard.  Pulmonary:      Effort: Pulmonary effort is normal.      Breath sounds: Normal breath sounds.   Abdominal:      General: Abdomen is flat. Bowel sounds are normal.      Palpations: Abdomen is soft.   Musculoskeletal:      Cervical back: Neck supple.   Feet:      Right foot:      Skin integrity: Skin integrity normal. No ulcer, " blister, skin breakdown, erythema, warmth or callus.      Toenail Condition: Right toenails are normal.      Left foot:      Skin integrity: Skin integrity normal. No ulcer, blister, skin breakdown, erythema, warmth or callus.      Toenail Condition: Left toenails are normal.   Lymphadenopathy:      Cervical: No cervical adenopathy.   Skin:     General: Skin is warm and dry.      Capillary Refill: Capillary refill takes more than 3 seconds.      Findings: No rash.   Neurological:      Mental Status: He is alert. Mental status is at baseline.      Sensory: Sensation is intact.   Psychiatric:         Mood and Affect: Mood normal.         Behavior: Behavior normal.         Thought Content: Thought content normal.         Judgment: Judgment normal.         Assessment/Plan   Problem List Items Addressed This Visit       Depression, major, recurrent, in complete remission (CMS-HCC)    Current Assessment & Plan     Major anxiety component improved with buspirone 30 mg twice a day and paroxetine 40 mg daily         Relevant Orders    Comprehensive Metabolic Panel    Hemoglobin A1C    Lipid Panel    Albumin , Urine Random    Tsh With Reflex To Free T4 If Abnormal    Follow [facility] In [facility] - PCP - Established    Follow Up In [facility] - Pharmacy    Dyslipidemia due to type 1 diabetes mellitus (Multi)    Current Assessment & Plan     Optimal LDL cholesterol continue rosuvastatin 40 mg 1 tablet daily reevaluate with next visit         Relevant Orders    Comprehensive Metabolic Panel    Hemoglobin A1C    Lipid Panel    Albumin , Urine Random    Tsh With Reflex To Free T4 If Abnormal    Follow [facility] - PCP - Established    Follow [facility] - Pharmacy    Frailty syndrome in geriatric patient    Current Assessment & Plan     Continue to encourage regular activity as tolerated at home         Relevant Orders    Comprehensive Metabolic Panel    Hemoglobin A1C    Lipid  Panel    Albumin , Urine Random    Tsh With Reflex To Free T4 If Abnormal    Follow Up In [facility] - PCP - Established    Follow Up In [facility] - Pharmacy    Diabetes 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  Consultation placed to chronic care nurse management patient in agreement for assistance in optimizing blood sugar control reevaluate in 3 months         Relevant Orders    Comprehensive Metabolic Panel    Hemoglobin A1C    Lipid Panel    Albumin , Urine Random    Tsh With Reflex To Free T4 If Abnormal    Follow Up In [facility] - PCP - Established    Follow Up In [facility] - Pharmacy    Type 1 diabetes mellitus with hypoglycemia and without coma (Multi) - Primary    Current Assessment & Plan     As noted above careful titration of insulin without incurring hypoglycemia with use of CGM monitoring Dexcom and careful titration of basal insulin with Toujeo         Relevant Orders    Comprehensive Metabolic Panel    Hemoglobin A1C    Lipid Panel    Albumin , Urine Random    Tsh With Reflex To Free T4 If Abnormal    Follow Up In [facility] - PCP - Established    Follow Up In [facility] - Pharmacy    Class 2 severe obesity due to excess calories with serious comorbidity and body mass index (BMI) of 36.0 to 36.9 in adult (Multi)    Overview     Continue to work with regular exercise  and reduce consumption of high processed foods optimizing A1c goal less than 9.0 while avoiding unnecessary hypoglycemia secondary to age and frailty         Current Assessment & Plan     Continue to work with regular exercise and reduce consumption of high processed foods optimizing A1c goal less than 9.0 while avoiding unnecessary hypoglycemia secondary to age and frailty          Relevant Orders    Comprehensive Metabolic Panel    Hemoglobin A1C    Lipid Panel    Albumin , Urine Random    Tsh With Reflex To Free T4 If Abnormal    Follow Up In [facility] - PCP - Established    Follow Up In [facility] - Pharmacy    Hypertensive heart disease with chronic diastolic congestive heart failure (Multi)    Current Assessment & Plan     Well compensated at this time with optimal blood pressure control continue carvedilol 12.5 mg twice a day with lisinopril hydrochlorothiazide 20/25 1 tablet daily         Relevant Orders    Comprehensive Metabolic Panel    Hemoglobin A1C    Lipid Panel    Albumin , Urine Random    Tsh With Reflex To Free T4 If Abnormal    Follow Up In [facility] - PCP - Established    Follow Up In [facility] - Pharmacy    Hypothyroidism (acquired)    Current Assessment & Plan     Slowly improving TSH improved from 12-5 Free T4 levels did not change we will increase levothyroxine from 25 mcg to 50 mcg daily and reevaluate next visit         Relevant Medications    levothyroxine (Synthroid, Levoxyl) 50 mcg tablet    Other Relevant Orders    Comprehensive Metabolic Panel    Hemoglobin A1C    Lipid Panel    Albumin , Urine Random    Tsh With Reflex To Free T4 If Abnormal    Follow Up In [facility] - PCP - Established    Follow Up In [facility] - Pharmacy

## 2024-04-23 NOTE — ASSESSMENT & PLAN NOTE
Suboptimal hemoglobin A1c recently increased to 9.4 does have element of hypoglycemia to be careful patient very dutiful and monitoring blood sugars 3 times a day with meals at this time consult pharmacy to assist in careful titration of Toujeo insulinMost recently titrated by myself at 48 units continues on basal bolus insulinWith NovoLog insulin for meal coverage and sliding scale attempt to safely optimize basal control to achieve A1c levels less than 9.  Consultation placed to chronic care nurse management patient in agreement for assistance in optimizing blood sugar control reevaluate in 3 months

## 2024-04-23 NOTE — ASSESSMENT & PLAN NOTE
As noted above careful titration of insulin without incurring hypoglycemia with use of CGM monitoring Dexcom and careful titration of basal insulin with Toujeo

## 2024-04-23 NOTE — ASSESSMENT & PLAN NOTE
Continue to work with regular exercise and reduce consumption of high processed foods optimizing A1c goal less than 9.0 while avoiding unnecessary hypoglycemia secondary to age and frailty

## 2024-04-23 NOTE — ASSESSMENT & PLAN NOTE
Slowly improving TSH improved from 12-5 Free T4 levels did not change we will increase levothyroxine from 25 mcg to 50 mcg daily and reevaluate next visit

## 2024-04-23 NOTE — ASSESSMENT & PLAN NOTE
Well compensated at this time with optimal blood pressure control continue carvedilol 12.5 mg twice a day with lisinopril hydrochlorothiazide 20/25 1 tablet daily

## 2024-05-13 ENCOUNTER — TELEPHONE (OUTPATIENT)
Dept: PRIMARY CARE | Facility: CLINIC | Age: 84
End: 2024-05-13
Payer: MEDICARE

## 2024-05-13 ENCOUNTER — DOCUMENTATION (OUTPATIENT)
Dept: PRIMARY CARE | Facility: CLINIC | Age: 84
End: 2024-05-13
Payer: MEDICARE

## 2024-05-13 NOTE — TELEPHONE ENCOUNTER
Patient brought in blood sugar log per dr varela   Medication update  Change from Toujeo 48 units to 50 units daily

## 2024-06-13 ENCOUNTER — TELEPHONE (OUTPATIENT)
Dept: PRIMARY CARE | Facility: CLINIC | Age: 84
End: 2024-06-13
Payer: MEDICARE

## 2024-06-13 NOTE — TELEPHONE ENCOUNTER
Patient has had swelling on right foot for almost two days, no pain. He is a diabetic on insulin. He wants to know if he should take a water pill

## 2024-07-01 ENCOUNTER — TELEPHONE (OUTPATIENT)
Dept: PRIMARY CARE | Facility: CLINIC | Age: 84
End: 2024-07-01
Payer: MEDICARE

## 2024-07-01 DIAGNOSIS — E55.9 VITAMIN D DEFICIENCY: Primary | ICD-10-CM

## 2024-07-01 RX ORDER — ERGOCALCIFEROL 1.25 MG/1
1 CAPSULE ORAL
Qty: 12 CAPSULE | Refills: 0 | Status: SHIPPED | OUTPATIENT
Start: 2024-07-01

## 2024-07-01 NOTE — TELEPHONE ENCOUNTER
Med Refill   ergocalciferol (Vitamin D-2) 1.25 MG (23539 UT) capsule [[id number]]     [facility] - [place], [place] - [address]  [address], [facility] [address]  Phone: [phone]  Fax: [phone]     LOV: 4/22/24    NOV: 7/22/24

## 2024-07-17 ENCOUNTER — TELEPHONE (OUTPATIENT)
Dept: PRIMARY CARE | Facility: CLINIC | Age: 84
End: 2024-07-17
Payer: MEDICARE

## 2024-07-17 DIAGNOSIS — I10 PRIMARY HYPERTENSION: ICD-10-CM

## 2024-07-17 DIAGNOSIS — K21.9 GASTROESOPHAGEAL REFLUX DISEASE WITHOUT ESOPHAGITIS: ICD-10-CM

## 2024-07-17 RX ORDER — LISINOPRIL AND HYDROCHLOROTHIAZIDE 20; 25 MG/1; MG/1
1 TABLET ORAL DAILY
Qty: 90 TABLET | Refills: 3 | Status: SHIPPED | OUTPATIENT
Start: 2024-07-17

## 2024-07-17 RX ORDER — LANSOPRAZOLE 30 MG/1
CAPSULE, DELAYED RELEASE ORAL
Qty: 90 CAPSULE | Refills: 3 | Status: SHIPPED | OUTPATIENT
Start: 2024-07-17

## 2024-07-17 NOTE — TELEPHONE ENCOUNTER
Rx Refill Request Telephone Encounter    Name:  Juanjose Douglass  :  [id number]  Medication Name:  Lisinopril-hydrochlorothiazide  20-25 mg        Take 1 tablet by mouth once daily  Specific Pharmacy location:  [facility]  Date of last appointment:  2024

## 2024-07-22 ENCOUNTER — APPOINTMENT (OUTPATIENT)
Dept: PRIMARY CARE | Facility: CLINIC | Age: 84
End: 2024-07-22
Payer: MEDICARE

## 2024-07-22 VITALS
SYSTOLIC BLOOD PRESSURE: 128 MMHG | WEIGHT: 241 LBS | BODY MASS INDEX: 36.53 KG/M2 | HEIGHT: 68 IN | HEART RATE: 56 BPM | DIASTOLIC BLOOD PRESSURE: 68 MMHG

## 2024-07-22 DIAGNOSIS — E10.69 DYSLIPIDEMIA DUE TO TYPE 1 DIABETES MELLITUS (MULTI): ICD-10-CM

## 2024-07-22 DIAGNOSIS — E66.01 CLASS 2 SEVERE OBESITY DUE TO EXCESS CALORIES WITH SERIOUS COMORBIDITY AND BODY MASS INDEX (BMI) OF 36.0 TO 36.9 IN ADULT (MULTI): ICD-10-CM

## 2024-07-22 DIAGNOSIS — E10.649 TYPE 1 DIABETES MELLITUS WITH HYPOGLYCEMIA AND WITHOUT COMA (MULTI): ICD-10-CM

## 2024-07-22 DIAGNOSIS — Z79.4 DIABETES MELLITUS DUE TO UNDERLYING CONDITION WITH STAGE 3A CHRONIC KIDNEY DISEASE, WITH LONG-TERM CURRENT USE OF INSULIN (MULTI): ICD-10-CM

## 2024-07-22 DIAGNOSIS — F33.42 DEPRESSION, MAJOR, RECURRENT, IN COMPLETE REMISSION (CMS-HCC): ICD-10-CM

## 2024-07-22 DIAGNOSIS — I11.0 HYPERTENSIVE HEART DISEASE WITH CHRONIC DIASTOLIC CONGESTIVE HEART FAILURE (MULTI): ICD-10-CM

## 2024-07-22 DIAGNOSIS — E08.22 DIABETES MELLITUS DUE TO UNDERLYING CONDITION WITH STAGE 3A CHRONIC KIDNEY DISEASE, WITH LONG-TERM CURRENT USE OF INSULIN (MULTI): ICD-10-CM

## 2024-07-22 DIAGNOSIS — I50.32 HYPERTENSIVE HEART DISEASE WITH CHRONIC DIASTOLIC CONGESTIVE HEART FAILURE (MULTI): ICD-10-CM

## 2024-07-22 DIAGNOSIS — R54 FRAILTY SYNDROME IN GERIATRIC PATIENT: ICD-10-CM

## 2024-07-22 DIAGNOSIS — E78.5 DYSLIPIDEMIA DUE TO TYPE 1 DIABETES MELLITUS (MULTI): ICD-10-CM

## 2024-07-22 DIAGNOSIS — E03.9 HYPOTHYROIDISM (ACQUIRED): ICD-10-CM

## 2024-07-22 DIAGNOSIS — N18.31 DIABETES MELLITUS DUE TO UNDERLYING CONDITION WITH STAGE 3A CHRONIC KIDNEY DISEASE, WITH LONG-TERM CURRENT USE OF INSULIN (MULTI): ICD-10-CM

## 2024-07-22 PROCEDURE — 1159F MED LIST DOCD IN RCRD: CPT | Performed by: INTERNAL MEDICINE

## 2024-07-22 PROCEDURE — 1036F TOBACCO NON-USER: CPT | Performed by: INTERNAL MEDICINE

## 2024-07-22 NOTE — PROGRESS NOTES
"Subjective   Patient ID: Juanjose Douglass is a 84 y.o. male who presents for Follow-up (Follow-up ).    HPI     Review of Systems    Objective   /68   Pulse 56   Ht 1.727 m (5' 8\")   Wt 109 kg (241 lb)   BMI 36.64 kg/m²     Physical Exam    Assessment/Plan          " No

## 2024-08-09 ENCOUNTER — LAB (OUTPATIENT)
Dept: LAB | Facility: LAB | Age: 84
End: 2024-08-09
Payer: MEDICARE

## 2024-08-09 DIAGNOSIS — I50.32 HYPERTENSIVE HEART DISEASE WITH CHRONIC DIASTOLIC CONGESTIVE HEART FAILURE (MULTI): ICD-10-CM

## 2024-08-09 DIAGNOSIS — Z79.4 DIABETES MELLITUS DUE TO UNDERLYING CONDITION WITH STAGE 3A CHRONIC KIDNEY DISEASE, WITH LONG-TERM CURRENT USE OF INSULIN (MULTI): ICD-10-CM

## 2024-08-09 DIAGNOSIS — I11.0 HYPERTENSIVE HEART DISEASE WITH CHRONIC DIASTOLIC CONGESTIVE HEART FAILURE (MULTI): ICD-10-CM

## 2024-08-09 DIAGNOSIS — E08.22 DIABETES MELLITUS DUE TO UNDERLYING CONDITION WITH STAGE 3A CHRONIC KIDNEY DISEASE, WITH LONG-TERM CURRENT USE OF INSULIN (MULTI): ICD-10-CM

## 2024-08-09 DIAGNOSIS — E03.9 HYPOTHYROIDISM (ACQUIRED): ICD-10-CM

## 2024-08-09 DIAGNOSIS — E78.5 DYSLIPIDEMIA DUE TO TYPE 1 DIABETES MELLITUS (MULTI): ICD-10-CM

## 2024-08-09 DIAGNOSIS — F33.42 DEPRESSION, MAJOR, RECURRENT, IN COMPLETE REMISSION (CMS-HCC): ICD-10-CM

## 2024-08-09 DIAGNOSIS — R54 FRAILTY SYNDROME IN GERIATRIC PATIENT: ICD-10-CM

## 2024-08-09 DIAGNOSIS — N18.31 DIABETES MELLITUS DUE TO UNDERLYING CONDITION WITH STAGE 3A CHRONIC KIDNEY DISEASE, WITH LONG-TERM CURRENT USE OF INSULIN (MULTI): ICD-10-CM

## 2024-08-09 DIAGNOSIS — E66.01 CLASS 2 SEVERE OBESITY DUE TO EXCESS CALORIES WITH SERIOUS COMORBIDITY AND BODY MASS INDEX (BMI) OF 36.0 TO 36.9 IN ADULT (MULTI): ICD-10-CM

## 2024-08-09 DIAGNOSIS — E10.649 TYPE 1 DIABETES MELLITUS WITH HYPOGLYCEMIA AND WITHOUT COMA (MULTI): ICD-10-CM

## 2024-08-09 DIAGNOSIS — E10.69 DYSLIPIDEMIA DUE TO TYPE 1 DIABETES MELLITUS (MULTI): ICD-10-CM

## 2024-08-09 LAB
ALBUMIN SERPL BCP-MCNC: 3.6 G/DL (ref 3.4–5)
ALP SERPL-CCNC: 95 U/L (ref 33–136)
ALT SERPL W P-5'-P-CCNC: 11 U/L (ref 10–52)
ANION GAP SERPL CALC-SCNC: 15 MMOL/L (ref 10–20)
AST SERPL W P-5'-P-CCNC: 16 U/L (ref 9–39)
BILIRUB SERPL-MCNC: 0.3 MG/DL (ref 0–1.2)
BUN SERPL-MCNC: 27 MG/DL (ref 6–23)
CALCIUM SERPL-MCNC: 8.7 MG/DL (ref 8.6–10.3)
CHLORIDE SERPL-SCNC: 109 MMOL/L (ref 98–107)
CHOLEST SERPL-MCNC: 140 MG/DL (ref 0–199)
CHOLESTEROL/HDL RATIO: 2.7
CO2 SERPL-SCNC: 21 MMOL/L (ref 21–32)
CREAT SERPL-MCNC: 1.56 MG/DL (ref 0.5–1.3)
CREAT UR-MCNC: 89.1 MG/DL (ref 20–370)
EGFRCR SERPLBLD CKD-EPI 2021: 44 ML/MIN/1.73M*2
EST. AVERAGE GLUCOSE BLD GHB EST-MCNC: 200 MG/DL
GLUCOSE SERPL-MCNC: 85 MG/DL (ref 74–99)
HBA1C MFR BLD: 8.6 %
HDLC SERPL-MCNC: 51.9 MG/DL
LDLC SERPL CALC-MCNC: 74 MG/DL
MICROALBUMIN UR-MCNC: 40.7 MG/L
MICROALBUMIN/CREAT UR: 45.7 UG/MG CREAT
NON HDL CHOLESTEROL: 88 MG/DL (ref 0–149)
POTASSIUM SERPL-SCNC: 4.7 MMOL/L (ref 3.5–5.3)
PROT SERPL-MCNC: 6.1 G/DL (ref 6.4–8.2)
SODIUM SERPL-SCNC: 140 MMOL/L (ref 136–145)
TRIGL SERPL-MCNC: 73 MG/DL (ref 0–149)
TSH SERPL-ACNC: 1.68 MIU/L (ref 0.44–3.98)
VLDL: 15 MG/DL (ref 0–40)

## 2024-08-09 PROCEDURE — 82043 UR ALBUMIN QUANTITATIVE: CPT

## 2024-08-09 PROCEDURE — 80061 LIPID PANEL: CPT

## 2024-08-09 PROCEDURE — 82570 ASSAY OF URINE CREATININE: CPT

## 2024-08-09 PROCEDURE — 36415 COLL VENOUS BLD VENIPUNCTURE: CPT

## 2024-08-09 PROCEDURE — 80053 COMPREHEN METABOLIC PANEL: CPT

## 2024-08-09 PROCEDURE — 84443 ASSAY THYROID STIM HORMONE: CPT

## 2024-08-09 PROCEDURE — 83036 HEMOGLOBIN GLYCOSYLATED A1C: CPT

## 2024-08-12 ENCOUNTER — APPOINTMENT (OUTPATIENT)
Dept: PRIMARY CARE | Facility: CLINIC | Age: 84
End: 2024-08-12
Payer: MEDICARE

## 2024-08-12 ENCOUNTER — TELEPHONE (OUTPATIENT)
Dept: PRIMARY CARE | Facility: CLINIC | Age: 84
End: 2024-08-12

## 2024-08-12 DIAGNOSIS — U07.1 COVID: ICD-10-CM

## 2024-08-12 NOTE — TELEPHONE ENCOUNTER
Headache, sore throat , cough , body aches & feels weak--started yesterday    Covid Test today Positive    He would like Rx for Paxlovid    To [facility]

## 2024-08-29 ENCOUNTER — APPOINTMENT (OUTPATIENT)
Dept: PRIMARY CARE | Facility: CLINIC | Age: 84
End: 2024-08-29
Payer: MEDICARE

## 2024-08-29 VITALS
BODY MASS INDEX: 36.37 KG/M2 | SYSTOLIC BLOOD PRESSURE: 120 MMHG | HEART RATE: 68 BPM | WEIGHT: 240 LBS | DIASTOLIC BLOOD PRESSURE: 70 MMHG | HEIGHT: 68 IN

## 2024-08-29 DIAGNOSIS — E10.649 TYPE 1 DIABETES MELLITUS WITH HYPOGLYCEMIA AND WITHOUT COMA (MULTI): Primary | ICD-10-CM

## 2024-08-29 DIAGNOSIS — E55.9 VITAMIN D DEFICIENCY: ICD-10-CM

## 2024-08-29 DIAGNOSIS — E08.22 DIABETES MELLITUS DUE TO UNDERLYING CONDITION WITH STAGE 3A CHRONIC KIDNEY DISEASE, WITH LONG-TERM CURRENT USE OF INSULIN (MULTI): ICD-10-CM

## 2024-08-29 DIAGNOSIS — I50.32 HYPERTENSIVE HEART DISEASE WITH CHRONIC DIASTOLIC CONGESTIVE HEART FAILURE (MULTI): ICD-10-CM

## 2024-08-29 DIAGNOSIS — Z79.4 DIABETES MELLITUS DUE TO UNDERLYING CONDITION WITH STAGE 3A CHRONIC KIDNEY DISEASE, WITH LONG-TERM CURRENT USE OF INSULIN (MULTI): ICD-10-CM

## 2024-08-29 DIAGNOSIS — N18.31 DIABETES MELLITUS DUE TO UNDERLYING CONDITION WITH STAGE 3A CHRONIC KIDNEY DISEASE, WITH LONG-TERM CURRENT USE OF INSULIN (MULTI): ICD-10-CM

## 2024-08-29 DIAGNOSIS — I11.0 HYPERTENSIVE HEART DISEASE WITH CHRONIC DIASTOLIC CONGESTIVE HEART FAILURE (MULTI): ICD-10-CM

## 2024-08-29 DIAGNOSIS — F33.42 DEPRESSION, MAJOR, RECURRENT, IN COMPLETE REMISSION (CMS-HCC): ICD-10-CM

## 2024-08-29 DIAGNOSIS — E66.01 CLASS 2 SEVERE OBESITY DUE TO EXCESS CALORIES WITH SERIOUS COMORBIDITY AND BODY MASS INDEX (BMI) OF 36.0 TO 36.9 IN ADULT (MULTI): ICD-10-CM

## 2024-08-29 DIAGNOSIS — R54 FRAILTY SYNDROME IN GERIATRIC PATIENT: ICD-10-CM

## 2024-08-29 DIAGNOSIS — E78.5 DYSLIPIDEMIA DUE TO TYPE 1 DIABETES MELLITUS (MULTI): ICD-10-CM

## 2024-08-29 DIAGNOSIS — E03.9 HYPOTHYROIDISM (ACQUIRED): ICD-10-CM

## 2024-08-29 DIAGNOSIS — E10.69 DYSLIPIDEMIA DUE TO TYPE 1 DIABETES MELLITUS (MULTI): ICD-10-CM

## 2024-08-29 PROCEDURE — 1036F TOBACCO NON-USER: CPT | Performed by: INTERNAL MEDICINE

## 2024-08-29 PROCEDURE — 1158F ADVNC CARE PLAN TLK DOCD: CPT | Performed by: INTERNAL MEDICINE

## 2024-08-29 PROCEDURE — G0009 ADMIN PNEUMOCOCCAL VACCINE: HCPCS | Performed by: INTERNAL MEDICINE

## 2024-08-29 PROCEDURE — 99214 OFFICE O/P EST MOD 30 MIN: CPT | Performed by: INTERNAL MEDICINE

## 2024-08-29 PROCEDURE — 1159F MED LIST DOCD IN RCRD: CPT | Performed by: INTERNAL MEDICINE

## 2024-08-29 PROCEDURE — 1160F RVW MEDS BY RX/DR IN RCRD: CPT | Performed by: INTERNAL MEDICINE

## 2024-08-29 PROCEDURE — 90677 PCV20 VACCINE IM: CPT | Performed by: INTERNAL MEDICINE

## 2024-08-29 PROCEDURE — 1123F ACP DISCUSS/DSCN MKR DOCD: CPT | Performed by: INTERNAL MEDICINE

## 2024-08-29 ASSESSMENT — ENCOUNTER SYMPTOMS
LOSS OF SENSATION IN FEET: 0
OCCASIONAL FEELINGS OF UNSTEADINESS: 0
DEPRESSION: 0

## 2024-08-29 NOTE — ASSESSMENT & PLAN NOTE
Hemoglobin A1c improved to 8.6 the best in 5 years no DKA or hypoglycemia events noted continues to do well with CGM monitor and Toujeo 50 units daily with NovoLog with meal coverage continue Dexcom

## 2024-08-29 NOTE — ASSESSMENT & PLAN NOTE
Consider well-balanced Mediterranean type diet with reduction in carbohydrate intake increase in protein intake and avoid snacking

## 2024-08-29 NOTE — PROGRESS NOTES
"Subjective   Reason for Visit: Juanjose Douglass is an 84 y.o. male here for a diabetes visit.     Past Medical, Surgical, and Family History reviewed and updated in chart.    Reviewed all medications by prescribing practitioner or clinical pharmacist (such as prescriptions, OTCs, herbal therapies and supplements) and documented in the medical record.    HPI    Patient Care Team:  Ki Browning DO as PCP - General  Ki Browning DO as PCP - [employer] PCP     Review of Systems   All other systems reviewed and are negative.      Objective   Vitals:  /70   Pulse 68   Ht 1.727 m (5' 8\")   Wt 109 kg (240 lb)   BMI 36.49 kg/m²       Physical Exam  Vitals and nursing note reviewed.   Constitutional:       General: He is not in acute distress.     Appearance: Normal appearance. He is well-developed. He is not toxic-appearing.   HENT:      Head: Normocephalic and atraumatic.      Right Ear: Tympanic membrane and external ear normal.      Left Ear: Tympanic membrane and external ear normal.      Nose: Nose normal.      Mouth/Throat:      Mouth: Mucous membranes are moist.      Pharynx: Oropharynx is clear. No oropharyngeal exudate or posterior oropharyngeal erythema.      Tonsils: No tonsillar exudate. 2+ on the right. 2+ on the left.   Eyes:      Extraocular Movements: Extraocular movements intact.      Conjunctiva/sclera: Conjunctivae normal.   Cardiovascular:      Rate and Rhythm: Normal rate and regular rhythm.      Pulses: Normal pulses.      Heart sounds: Normal heart sounds. No murmur heard.  Pulmonary:      Effort: Pulmonary effort is normal.      Breath sounds: Normal breath sounds.   Abdominal:      General: Abdomen is flat. Bowel sounds are normal.      Palpations: Abdomen is soft.   Musculoskeletal:      Cervical back: Neck supple.   Feet:      Right foot:      Skin integrity: Skin integrity normal. No ulcer, blister, skin breakdown, erythema, warmth or callus.      Toenail " Condition: Right toenails are normal.      Left foot:      Skin integrity: Skin integrity normal. No ulcer, blister, skin breakdown, erythema, warmth or callus.      Toenail Condition: Left toenails are normal.   Lymphadenopathy:      Cervical: No cervical adenopathy.   Skin:     General: Skin is warm and dry.      Capillary Refill: Capillary refill takes more than 3 seconds.      Findings: No rash.   Neurological:      Mental Status: He is alert. Mental status is at baseline.      Sensory: Sensation is intact.   Psychiatric:         Mood and Affect: Mood normal.         Behavior: Behavior normal.         Thought Content: Thought content normal.         Judgment: Judgment normal.     Lifestyle Recommendations  I recommend a whole-food plant-based diet, an eating pattern that encourages the consumption of unrefined plant foods (such as fruits, vegetables, tubers, whole grains, legumes, nuts and seeds) and discourages meats, dairy products, eggs and processed foods.     The AHA/ACC recommends that the patient consume a dietary pattern that emphasizes intake of vegetables, fruits, and whole grains; includes low-fat dairy products, poultry, fish, legumes, non-tropical vegetable oils, and nuts; and limits intake of sodium, sweets, sugar-sweetened beverages, and red meats.  Adapt this dietary pattern to appropriate calorie requirements (a 500-750 kcal/day deficit to loose weight), personal and cultural food preferences, and nutrition therapy for other medical conditions (including diabetes).  Achieve this pattern by following plans such as the [place] Mediterranean, DASH dietary pattern, or AHA diet.     Engage in 2 hours and 30 minutes per week of moderate-intensity physical activity, or 1 hour and 15 minutes (75 minutes) per week of vigorous-intensity aerobic physical activity, or an equivalent combination of moderate and vigorous-intensity aerobic physical activity. Aerobic activity should be performed in episodes of at  least 10 minutes preferably spread throughout the week.     Adhering to a heart healthy diet, regular exercise habits, avoidance of tobacco products, and maintenance of a healthy weight are crucial components of their heart disease risk reduction.    Assessment/Plan   Problem List Items Addressed This Visit             ICD-10-CM    Depression, major, recurrent, in complete remission (CMS-HCC) F33.42     Stable at this time on paroxetine 40 mg daily with buspirone 30 mg twice a day to manage anxiety         Relevant Orders    Comprehensive Metabolic Panel    Hemoglobin A1C    Lipid Panel    Albumin-Creatinine Ratio, Urine Random    Tsh With Reflex To Free T4 If Abnormal    Follow Up In [facility] - PCP - Established    Dyslipidemia due to type 1 diabetes mellitus (Multi) E10.69, E78.5     LDL cholesterol optimal at 74 with triglycerides 73 HDL of 52 continue rosuvastatin 40 mg daily         Relevant Orders    Comprehensive Metabolic Panel    Hemoglobin A1C    Lipid Panel    Albumin-Creatinine Ratio, Urine Random    Tsh With Reflex To Free T4 If Abnormal    Follow Up In [facility] - PCP - Established    Frailty syndrome in geriatric patient R54     Continue to monitor closely reduce fall risk in the setting of lumbar spine stenosis         Relevant Orders    Comprehensive Metabolic Panel    Hemoglobin A1C    Lipid Panel    Albumin-Creatinine Ratio, Urine Random    Tsh With Reflex To Free T4 If Abnormal    Follow Up In [facility] - PCP - Established    Diabetes 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 3a chronic kidney disease, with long-term current use of insulin (Multi) E08.22, N18.31, Z79.4     Hemoglobin A1c improved to 8.6 the best in 5 years no DKA or hypoglycemia events noted continues to do well with CGM monitor and Toujeo 50 units daily with NovoLog with meal coverage continue Dexcom         Type 1 diabetes mellitus with hypoglycemia and without coma (Multi) - Primary  E10.649     Stable without evidence of recurrent DKA         Relevant Orders    Comprehensive Metabolic Panel    Hemoglobin A1C    Lipid Panel    Albumin-Creatinine Ratio, Urine Random    Tsh With Reflex To Free T4 If Abnormal    Follow Up In [facility] - PCP - Established    Class 2 severe obesity due to excess calories with serious comorbidity and body mass index (BMI) of 36.0 to 36.9 in adult (Multi) E66.01, Z68.36     Consider well-balanced Mediterranean type diet with reduction in carbohydrate intake increase in protein intake and avoid snacking         Hypertensive heart disease with chronic diastolic congestive heart failure (Multi) I11.0, I50.32     Well compensated continue carvedilol 12.5 mg twice a day with lisinopril hydrochlorothiazide 20/25 1 tablet daily         Relevant Orders    Comprehensive Metabolic Panel    Hemoglobin A1C    Lipid Panel    Albumin-Creatinine Ratio, Urine Random    Tsh With Reflex To Free T4 If Abnormal    Follow Up In [facility] - PCP - Established    Hypothyroidism (acquired) E03.9     Clinically euthyroid on levothyroxine 50 mcg daily continue         Relevant Orders    Tsh With Reflex To Free T4 If Abnormal     Other Visit Diagnoses         Codes    Vitamin D deficiency     E55.9    Relevant Orders    Vitamin D 25-Hydroxy,Total (for eval of Vitamin D levels)

## 2024-08-29 NOTE — ASSESSMENT & PLAN NOTE
Well compensated continue carvedilol 12.5 mg twice a day with lisinopril hydrochlorothiazide 20/25 1 tablet daily

## 2024-08-29 NOTE — PROGRESS NOTES
"Subjective   Patient ID: Juanjose Douglass is a 84 y.o. male who presents for Follow-up.    HPI     Review of Systems    Objective   /70   Pulse 68   Ht 1.727 m (5' 8\")   Wt 109 kg (240 lb)   BMI 36.49 kg/m²     Physical Exam    Assessment/Plan          "

## 2024-09-04 DIAGNOSIS — N18.31 DIABETES MELLITUS DUE TO UNDERLYING CONDITION WITH STAGE 3A CHRONIC KIDNEY DISEASE, WITH LONG-TERM CURRENT USE OF INSULIN (MULTI): Primary | ICD-10-CM

## 2024-09-04 DIAGNOSIS — Z79.4 DIABETES MELLITUS DUE TO UNDERLYING CONDITION WITH STAGE 3A CHRONIC KIDNEY DISEASE, WITH LONG-TERM CURRENT USE OF INSULIN (MULTI): Primary | ICD-10-CM

## 2024-09-04 DIAGNOSIS — E08.22 DIABETES MELLITUS DUE TO UNDERLYING CONDITION WITH STAGE 3A CHRONIC KIDNEY DISEASE, WITH LONG-TERM CURRENT USE OF INSULIN (MULTI): Primary | ICD-10-CM

## 2024-09-04 RX ORDER — PEN NEEDLE, DIABETIC 30 GX3/16"
1 NEEDLE, DISPOSABLE MISCELLANEOUS
Qty: 200 EACH | Refills: 3 | Status: SHIPPED | OUTPATIENT
Start: 2024-09-04 | End: 2025-09-04

## 2024-09-21 DIAGNOSIS — I25.10 ASHD (ARTERIOSCLEROTIC HEART DISEASE): ICD-10-CM

## 2024-09-23 RX ORDER — CARVEDILOL 12.5 MG/1
12.5 TABLET ORAL EVERY 12 HOURS
Qty: 180 TABLET | Refills: 3 | Status: SHIPPED | OUTPATIENT
Start: 2024-09-23

## 2024-09-27 ENCOUNTER — TELEPHONE (OUTPATIENT)
Dept: PRIMARY CARE | Facility: CLINIC | Age: 84
End: 2024-09-27
Payer: MEDICARE

## 2024-09-27 DIAGNOSIS — E55.9 VITAMIN D DEFICIENCY: ICD-10-CM

## 2024-09-27 RX ORDER — ERGOCALCIFEROL 1.25 MG/1
1 CAPSULE ORAL
Qty: 12 CAPSULE | Refills: 0 | Status: SHIPPED | OUTPATIENT
Start: 2024-09-29

## 2024-10-09 ENCOUNTER — TELEPHONE (OUTPATIENT)
Dept: PRIMARY CARE | Facility: CLINIC | Age: 84
End: 2024-10-09
Payer: MEDICARE

## 2024-10-09 DIAGNOSIS — E78.5 DYSLIPIDEMIA, GOAL LDL BELOW 70: ICD-10-CM

## 2024-10-09 DIAGNOSIS — F33.42 DEPRESSION, MAJOR, RECURRENT, IN COMPLETE REMISSION (CMS-HCC): ICD-10-CM

## 2024-10-09 RX ORDER — ROSUVASTATIN CALCIUM 40 MG/1
40 TABLET, COATED ORAL DAILY
Qty: 90 TABLET | Refills: 3 | Status: SHIPPED | OUTPATIENT
Start: 2024-10-09

## 2024-10-09 RX ORDER — PAROXETINE HYDROCHLORIDE 40 MG/1
40 TABLET, FILM COATED ORAL EVERY MORNING
Qty: 90 TABLET | Refills: 3 | Status: SHIPPED | OUTPATIENT
Start: 2024-10-09 | End: 2025-10-09

## 2024-10-09 NOTE — TELEPHONE ENCOUNTER
Med Refill   rosuvastatin (Crestor) 40 mg tablet [[id number]]   PARoxetine (Paxil) 40 mg tablet [[id number]]     [facility] - [place], [place] - [address]  [address], [facility] [address]  Phone: [phone]  Fax: [phone]  FERMÍN #: --

## 2024-10-30 ENCOUNTER — CLINICAL SUPPORT (OUTPATIENT)
Dept: PRIMARY CARE | Facility: CLINIC | Age: 84
End: 2024-10-30
Payer: MEDICARE

## 2024-10-30 DIAGNOSIS — Z23 FLU VACCINE NEED: ICD-10-CM

## 2024-10-30 PROCEDURE — 90662 IIV NO PRSV INCREASED AG IM: CPT | Performed by: INTERNAL MEDICINE

## 2024-10-30 PROCEDURE — G0008 ADMIN INFLUENZA VIRUS VAC: HCPCS | Performed by: INTERNAL MEDICINE

## 2024-11-14 ENCOUNTER — TELEPHONE (OUTPATIENT)
Dept: PRIMARY CARE | Facility: CLINIC | Age: 84
End: 2024-11-14
Payer: MEDICARE

## 2024-11-14 DIAGNOSIS — I10 PRIMARY HYPERTENSION: ICD-10-CM

## 2024-11-14 DIAGNOSIS — R54 FRAILTY SYNDROME IN GERIATRIC PATIENT: ICD-10-CM

## 2024-11-14 DIAGNOSIS — M48.062 LUMBAR STENOSIS WITH NEUROGENIC CLAUDICATION: ICD-10-CM

## 2024-11-14 DIAGNOSIS — M25.552 JOINT PAIN OF LEFT HIP ON MOVEMENT: ICD-10-CM

## 2024-11-25 ENCOUNTER — TELEPHONE (OUTPATIENT)
Dept: PRIMARY CARE | Facility: CLINIC | Age: 84
End: 2024-11-25
Payer: MEDICARE

## 2024-11-25 NOTE — TELEPHONE ENCOUNTER
Patient said that he had a faulty sensor and t was reading low he has since fixed that issue, he took a urine test and it was in the 600s

## 2024-12-03 ENCOUNTER — APPOINTMENT (OUTPATIENT)
Dept: PRIMARY CARE | Facility: CLINIC | Age: 84
End: 2024-12-03
Payer: MEDICARE

## 2024-12-10 ENCOUNTER — TELEPHONE (OUTPATIENT)
Dept: PRIMARY CARE | Facility: CLINIC | Age: 84
End: 2024-12-10
Payer: MEDICARE

## 2024-12-10 NOTE — TELEPHONE ENCOUNTER
Patient brought in blood sugar log   Patient currently takse novolog 4 units in the morning, 2 at lunch, 4 with dinner.  Toujeo 48 units daily    Per the readings Dr. Browning increase the Toujeo to 50units daily    Patient was notified

## 2024-12-20 DIAGNOSIS — E55.9 VITAMIN D DEFICIENCY: ICD-10-CM

## 2024-12-20 RX ORDER — ERGOCALCIFEROL 1.25 MG/1
CAPSULE ORAL
Qty: 12 CAPSULE | Refills: 3 | Status: SHIPPED | OUTPATIENT
Start: 2024-12-20

## 2025-01-03 ENCOUNTER — TELEPHONE (OUTPATIENT)
Dept: PRIMARY CARE | Facility: CLINIC | Age: 85
End: 2025-01-03
Payer: MEDICARE

## 2025-01-03 DIAGNOSIS — F33.42 DEPRESSION, MAJOR, RECURRENT, IN COMPLETE REMISSION (CMS-HCC): ICD-10-CM

## 2025-01-03 DIAGNOSIS — F41.9 ANXIETY: ICD-10-CM

## 2025-01-03 RX ORDER — BUSPIRONE HYDROCHLORIDE 30 MG/1
30 TABLET ORAL 2 TIMES DAILY
Qty: 180 TABLET | Refills: 3 | Status: SHIPPED | OUTPATIENT
Start: 2025-01-03

## 2025-01-03 NOTE — TELEPHONE ENCOUNTER
Med refill   busPIRone (Buspar) 30 mg tablet [[id number]]     [facility] - [place], [place] - [address]  [address], [facility] [address]  Phone: [phone]  Fax: [phone]  FERMÍN #: --

## 2025-01-03 NOTE — TELEPHONE ENCOUNTER
Med refill   busPIRone (Buspar) 30 mg tablet [[id number]]      [facility] - [place], [place] - [address]  [address], [facility] [address]  Phone: [phone]  Fax: [phone]  FERMÍN #: --

## 2025-01-05 ENCOUNTER — APPOINTMENT (OUTPATIENT)
Dept: CARDIOLOGY | Facility: HOSPITAL | Age: 85
End: 2025-01-05
Payer: MEDICARE

## 2025-01-05 ENCOUNTER — HOSPITAL ENCOUNTER (EMERGENCY)
Facility: HOSPITAL | Age: 85
Discharge: HOME | End: 2025-01-05
Attending: EMERGENCY MEDICINE
Payer: MEDICARE

## 2025-01-05 ENCOUNTER — APPOINTMENT (OUTPATIENT)
Dept: RADIOLOGY | Facility: HOSPITAL | Age: 85
End: 2025-01-05
Payer: MEDICARE

## 2025-01-05 VITALS
HEIGHT: 68 IN | HEART RATE: 71 BPM | RESPIRATION RATE: 14 BRPM | DIASTOLIC BLOOD PRESSURE: 67 MMHG | WEIGHT: 241.62 LBS | BODY MASS INDEX: 36.62 KG/M2 | TEMPERATURE: 96.2 F | SYSTOLIC BLOOD PRESSURE: 156 MMHG | OXYGEN SATURATION: 97 %

## 2025-01-05 DIAGNOSIS — R11.11 VOMITING WITHOUT NAUSEA, UNSPECIFIED VOMITING TYPE: Primary | ICD-10-CM

## 2025-01-05 LAB
ALBUMIN SERPL BCP-MCNC: 3.6 G/DL (ref 3.4–5)
ALP SERPL-CCNC: 78 U/L (ref 33–136)
ALT SERPL W P-5'-P-CCNC: 11 U/L (ref 10–52)
ANION GAP BLDV CALCULATED.4IONS-SCNC: 7 MMOL/L (ref 10–25)
ANION GAP SERPL CALC-SCNC: 13 MMOL/L (ref 10–20)
AST SERPL W P-5'-P-CCNC: 15 U/L (ref 9–39)
BASE EXCESS BLDV CALC-SCNC: -1.4 MMOL/L (ref -2–3)
BASOPHILS # BLD AUTO: 0.08 X10*3/UL (ref 0–0.1)
BASOPHILS NFR BLD AUTO: 1.2 %
BILIRUB DIRECT SERPL-MCNC: 0 MG/DL (ref 0–0.3)
BILIRUB SERPL-MCNC: 0.4 MG/DL (ref 0–1.2)
BNP SERPL-MCNC: 39 PG/ML (ref 0–99)
BODY TEMPERATURE: 37 DEGREES CELSIUS
BUN SERPL-MCNC: 35 MG/DL (ref 6–23)
CA-I BLDV-SCNC: 1.22 MMOL/L (ref 1.1–1.33)
CALCIUM SERPL-MCNC: 8.7 MG/DL (ref 8.6–10.3)
CARDIAC TROPONIN I PNL SERPL HS: 11 NG/L (ref 0–20)
CARDIAC TROPONIN I PNL SERPL HS: 6 NG/L (ref 0–20)
CHLORIDE BLDV-SCNC: 104 MMOL/L (ref 98–107)
CHLORIDE SERPL-SCNC: 104 MMOL/L (ref 98–107)
CO2 SERPL-SCNC: 21 MMOL/L (ref 21–32)
CREAT SERPL-MCNC: 1.5 MG/DL (ref 0.5–1.3)
EGFRCR SERPLBLD CKD-EPI 2021: 46 ML/MIN/1.73M*2
EOSINOPHIL # BLD AUTO: 0.3 X10*3/UL (ref 0–0.4)
EOSINOPHIL NFR BLD AUTO: 4.6 %
ERYTHROCYTE [DISTWIDTH] IN BLOOD BY AUTOMATED COUNT: 13.2 % (ref 11.5–14.5)
GLUCOSE BLDV-MCNC: 209 MG/DL (ref 74–99)
GLUCOSE SERPL-MCNC: 197 MG/DL (ref 74–99)
HCO3 BLDV-SCNC: 22.8 MMOL/L (ref 22–26)
HCT VFR BLD AUTO: 38.1 % (ref 41–52)
HCT VFR BLD EST: 41 % (ref 41–52)
HGB BLD-MCNC: 12.8 G/DL (ref 13.5–17.5)
HGB BLDV-MCNC: 13.5 G/DL (ref 13.5–17.5)
IMM GRANULOCYTES # BLD AUTO: 0.03 X10*3/UL (ref 0–0.5)
IMM GRANULOCYTES NFR BLD AUTO: 0.5 % (ref 0–0.9)
INHALED O2 CONCENTRATION: 21 %
LACTATE BLDV-SCNC: 1.4 MMOL/L (ref 0.4–2)
LIPASE SERPL-CCNC: 20 U/L (ref 9–82)
LYMPHOCYTES # BLD AUTO: 0.5 X10*3/UL (ref 0.8–3)
LYMPHOCYTES NFR BLD AUTO: 7.7 %
MAGNESIUM SERPL-MCNC: 1.95 MG/DL (ref 1.6–2.4)
MCH RBC QN AUTO: 29.1 PG (ref 26–34)
MCHC RBC AUTO-ENTMCNC: 33.6 G/DL (ref 32–36)
MCV RBC AUTO: 87 FL (ref 80–100)
MONOCYTES # BLD AUTO: 0.77 X10*3/UL (ref 0.05–0.8)
MONOCYTES NFR BLD AUTO: 11.9 %
NEUTROPHILS # BLD AUTO: 4.81 X10*3/UL (ref 1.6–5.5)
NEUTROPHILS NFR BLD AUTO: 74.1 %
NRBC BLD-RTO: 0 /100 WBCS (ref 0–0)
OXYHGB MFR BLDV: 82.7 % (ref 45–75)
PCO2 BLDV: 36 MM HG (ref 41–51)
PH BLDV: 7.41 PH (ref 7.33–7.43)
PLATELET # BLD AUTO: 267 X10*3/UL (ref 150–450)
PO2 BLDV: 53 MM HG (ref 35–45)
POTASSIUM BLDV-SCNC: 4.2 MMOL/L (ref 3.5–5.3)
POTASSIUM SERPL-SCNC: 4 MMOL/L (ref 3.5–5.3)
PROT SERPL-MCNC: 6.3 G/DL (ref 6.4–8.2)
RBC # BLD AUTO: 4.4 X10*6/UL (ref 4.5–5.9)
SAO2 % BLDV: 85 % (ref 45–75)
SODIUM BLDV-SCNC: 130 MMOL/L (ref 136–145)
SODIUM SERPL-SCNC: 134 MMOL/L (ref 136–145)
WBC # BLD AUTO: 6.5 X10*3/UL (ref 4.4–11.3)

## 2025-01-05 PROCEDURE — 36415 COLL VENOUS BLD VENIPUNCTURE: CPT | Performed by: EMERGENCY MEDICINE

## 2025-01-05 PROCEDURE — 83690 ASSAY OF LIPASE: CPT | Performed by: EMERGENCY MEDICINE

## 2025-01-05 PROCEDURE — 96360 HYDRATION IV INFUSION INIT: CPT

## 2025-01-05 PROCEDURE — 2500000004 HC RX 250 GENERAL PHARMACY W/ HCPCS (ALT 636 FOR OP/ED): Performed by: EMERGENCY MEDICINE

## 2025-01-05 PROCEDURE — 84484 ASSAY OF TROPONIN QUANT: CPT | Performed by: EMERGENCY MEDICINE

## 2025-01-05 PROCEDURE — 71045 X-RAY EXAM CHEST 1 VIEW: CPT | Performed by: RADIOLOGY

## 2025-01-05 PROCEDURE — 83880 ASSAY OF NATRIURETIC PEPTIDE: CPT | Performed by: EMERGENCY MEDICINE

## 2025-01-05 PROCEDURE — 99285 EMERGENCY DEPT VISIT HI MDM: CPT | Mod: 25 | Performed by: EMERGENCY MEDICINE

## 2025-01-05 PROCEDURE — 85025 COMPLETE CBC W/AUTO DIFF WBC: CPT | Performed by: EMERGENCY MEDICINE

## 2025-01-05 PROCEDURE — 93005 ELECTROCARDIOGRAM TRACING: CPT

## 2025-01-05 PROCEDURE — 71045 X-RAY EXAM CHEST 1 VIEW: CPT

## 2025-01-05 PROCEDURE — 83735 ASSAY OF MAGNESIUM: CPT | Performed by: EMERGENCY MEDICINE

## 2025-01-05 PROCEDURE — 84132 ASSAY OF SERUM POTASSIUM: CPT | Performed by: EMERGENCY MEDICINE

## 2025-01-05 PROCEDURE — 84132 ASSAY OF SERUM POTASSIUM: CPT | Mod: 59 | Performed by: EMERGENCY MEDICINE

## 2025-01-05 RX ADMIN — SODIUM CHLORIDE 500 ML: 9 INJECTION, SOLUTION INTRAVENOUS at 00:48

## 2025-01-05 ASSESSMENT — COLUMBIA-SUICIDE SEVERITY RATING SCALE - C-SSRS
6. HAVE YOU EVER DONE ANYTHING, STARTED TO DO ANYTHING, OR PREPARED TO DO ANYTHING TO END YOUR LIFE?: NO
1. IN THE PAST MONTH, HAVE YOU WISHED YOU WERE DEAD OR WISHED YOU COULD GO TO SLEEP AND NOT WAKE UP?: NO
2. HAVE YOU ACTUALLY HAD ANY THOUGHTS OF KILLING YOURSELF?: NO

## 2025-01-05 ASSESSMENT — LIFESTYLE VARIABLES
EVER HAD A DRINK FIRST THING IN THE MORNING TO STEADY YOUR NERVES TO GET RID OF A HANGOVER: NO
HAVE YOU EVER FELT YOU SHOULD CUT DOWN ON YOUR DRINKING: NO
EVER FELT BAD OR GUILTY ABOUT YOUR DRINKING: NO
HAVE PEOPLE ANNOYED YOU BY CRITICIZING YOUR DRINKING: NO
TOTAL SCORE: 0

## 2025-01-05 ASSESSMENT — PAIN - FUNCTIONAL ASSESSMENT: PAIN_FUNCTIONAL_ASSESSMENT: 0-10

## 2025-01-05 ASSESSMENT — PAIN SCALES - GENERAL: PAINLEVEL_OUTOF10: 0 - NO PAIN

## 2025-01-05 NOTE — ED PROVIDER NOTES
HPI   Chief Complaint   Patient presents with    Vomiting    Dizziness       HPI:  84-year-old male with history of diabetes presents emergency department via EMS after becoming dizzy lightheaded and having several episodes of vomiting.  He says he was in his usual state of health had [employer] pizza for dinner and was sitting on the couch watching the ball game on TV shortly after 10:00 he was getting up to go to bed and upon standing felt lightheaded dizzy and began having multiple episodes of vomiting denies any chest discomfort or shortness of breath denies any abdominal pain says that things came on very acutely and he was feeling fine prior to this now that he is here in the emergency department he states that he is feeling back to his normal self he does note that his blood sugars seem to be pretty erratic today with readings that were high as well as low.  Currently denies any abdominal pain denies any fevers chills or cough cold symptoms denies any chest discomfort or shortness of breath states he is just mildly lightheaded    Limitations to history: None  Independent Historians: EMS report  External Records Reviewed: EMR records  ------------------------------------------------------------------------------------------------------------------------------------------  ROS: a ten point review of systems was performed and negative except as per HPI.  ------------------------------------------------------------------------------------------------------------------------------------------  PMH / PSH: as per HPI, reviewed in EMR and discussed with the patient []  MEDS:  reviewed in EMR and discussed with the patient []  ALLERGIES: reviewed in EMR[]  SocH:  as per HPI, otherwise reviewed in EMR []  FH:  as per HPI, otherwise reviewed in EMR []  ------------------------------------------------------------------------------------------------------------------------------------------  Physical Exam:  VS: As  documented in the triage note and EMR flowsheet from this visit was reviewed  General: Well appearing. No acute distress.   Eyes:  Extraocular movements grossly intact. No scleral icterus.   HEENT: Atraumatic. Normocephalic.    Neck: Supple. No gross masses  CV: RRR, audible S1/S2, 2+ symmetric peripheral pulses  Resp: Clear to auscultation bilaterally. No respiratory distress.  Non-labored respirations  GI: Soft, non-tender, non-distended, no rebound or gaurding  MSK: Symmetric muscle bulk. No gross step offs or deformities.  Skin: Warm, dry, no obvious rash.  Neuro: Speech fluent. Awake. Alert. Appropriate conversation.  Psych: Appropriate mood and affect for situation  ------------------------------------------------------------------------------------------------------------------------------------------  Hospital Course / Medical Decision Making:  Independent Interpretations:  EKG -   Twelve-lead EKG performed and independently interpreted by me shows sinus rhythm at a ventricular rate of 51 borderline T wave abnormalities that are nonspecific in the anterior leads QRS duration of 103 QTc was 428 axis upright and normal normal P axis no signs of acute ischemia    Peripheral IV access obtained labs are drawn patient hooked up to the cardiac monitor.  He was feeling much better after vomiting and did not have any episodes of vomiting while here in the emergency department.  He had lab work which was reassuring white blood cell count was normal he was hemodynamically stable electrolytes were all normal.  Chest x-ray read as atelectasis versus underlying consolidation however clinically the patient denies any symptoms of pneumonia no cough cold congestion no shortness of breath.  The patient has a nonfocal neurologic exam and feels back to his baseline normal self he is requesting discharge home as he is feeling comfortable and feeling better.  Patient discharge from the ED in stable condition    Patient care  discussed with: [Admitting team, consultants, social work, THRIVE, SANE, radiology]  Social Determinants affecting care: [Problems affording transportation, inability to afford prescriptions, lack of housing, lack of insurance]    Final diagnosis and disposition: [] Nigel Yeung MD                          Patient History   Past Medical History:   Diagnosis Date    Abnormal result of other cardiovascular function study 08/06/2019    Abnormal stress test    Actinic keratosis of scalp 06/01/2023    Corns and callosities 09/28/2020    Pre-ulcerative corn or callous    Personal history of diseases of the skin and subcutaneous tissue     History of actinic keratosis    Personal history of other diseases of the circulatory system     History of hypertension    Personal history of other diseases of the digestive system     H/O gastroesophageal reflux (GERD)    Personal history of other diseases of the musculoskeletal system and connective tissue     History of herniated intervertebral disc    Personal history of other diseases of the nervous system and sense organs     History of obstructive sleep apnea    Personal history of other endocrine, nutritional and metabolic disease 02/18/2020    History of type 2 diabetes mellitus    Personal history of other endocrine, nutritional and metabolic disease 06/15/2020    History of hyperlipidemia    Personal history of other endocrine, nutritional and metabolic disease     History of type 1 diabetes mellitus    Personal history of other mental and behavioral disorders 04/25/2022    History of anxiety    Personal history of other mental and behavioral disorders 01/21/2020    History of depression    Shortness of breath 08/06/2019    Shortness of breath on exertion    Type 2 diabetes mellitus with diabetic polyneuropathy (Multi) 06/15/2020    Type 2 diabetes mellitus with diabetic polyneuropathy, with long-term current use of insulin    Venous insufficiency  (chronic) (peripheral)     Venous insufficiency     Past Surgical History:   Procedure Laterality Date    APPENDECTOMY  05/19/2016    Appendectomy    HERNIA REPAIR  05/19/2016    Hernia Repair    OTHER SURGICAL HISTORY  05/19/2016    Primary Repair Of Ruptured Achilles Tendon    OTHER SURGICAL HISTORY  04/25/2022    Skin lesion excision    OTHER SURGICAL HISTORY  01/21/2020    Lumbar nerve ablation    TONSILLECTOMY  05/19/2016    Tonsillectomy     Family History   Problem Relation Name Age of Onset    Other (CVA) Other      Coronary artery disease Other       Social History     Tobacco Use    Smoking status: Never    Smokeless tobacco: Never   Vaping Use    Vaping status: Never Used   Substance Use Topics    Alcohol use: Yes     Comment: OCCAIONAL    Drug use: Never       Physical Exam   ED Triage Vitals [01/05/25 0006]   Temperature Heart Rate Respirations BP   35.7 °C (96.2 °F) 50 18 133/80      Pulse Ox Temp Source Heart Rate Source Patient Position   98 % Temporal Monitor Lying      BP Location FiO2 (%)     Right arm --       Physical Exam      ED Course & MDM   Diagnoses as of 01/05/25 0501   Vomiting without nausea, unspecified vomiting type                 No data recorded     Esme Coma Scale Score: 15 (01/05/25 0021 : Myranda Lockwood, NABIL)                           Medical Decision Making      Procedure  Procedures     Dorothy Yeung MD  01/05/25 0508

## 2025-01-06 LAB
ATRIAL RATE: 48 BPM
ATRIAL RATE: 51 BPM
P AXIS: 31 DEGREES
P AXIS: 48 DEGREES
PR INTERVAL: 163 MS
PR INTERVAL: 177 MS
Q ONSET: 253 MS
Q ONSET: 253 MS
QRS COUNT: 8 BEATS
QRS COUNT: 8 BEATS
QRS DURATION: 103 MS
QRS DURATION: 104 MS
QT INTERVAL: 438 MS
QT INTERVAL: 464 MS
QTC CALCULATION(BAZETT): 392 MS
QTC CALCULATION(BAZETT): 428 MS
QTC FREDERICIA: 406 MS
QTC FREDERICIA: 440 MS
R AXIS: 0 DEGREES
R AXIS: 13 DEGREES
T AXIS: 43 DEGREES
T AXIS: 49 DEGREES
T OFFSET: 472 MS
T OFFSET: 485 MS
VENTRICULAR RATE: 48 BPM
VENTRICULAR RATE: 51 BPM

## 2025-02-27 DIAGNOSIS — E10.649 TYPE 1 DIABETES MELLITUS WITH HYPOGLYCEMIA AND WITHOUT COMA: ICD-10-CM

## 2025-02-27 RX ORDER — INSULIN GLARGINE 300 [IU]/ML
50 INJECTION, SOLUTION SUBCUTANEOUS DAILY
Qty: 15 ML | Refills: 3 | Status: SHIPPED | OUTPATIENT
Start: 2025-02-27 | End: 2026-02-27

## 2025-02-27 RX ORDER — INSULIN ASPART 100 [IU]/ML
INJECTION, SOLUTION INTRAVENOUS; SUBCUTANEOUS
Qty: 15 ML | Refills: 3 | Status: SHIPPED | OUTPATIENT
Start: 2025-02-27

## 2025-03-06 DIAGNOSIS — H91.13 PRESBYCUSIS OF BOTH EARS: Primary | ICD-10-CM

## 2025-03-13 ENCOUNTER — CLINICAL SUPPORT (OUTPATIENT)
Dept: AUDIOLOGY | Facility: HOSPITAL | Age: 85
End: 2025-03-13
Payer: MEDICARE

## 2025-03-13 ENCOUNTER — APPOINTMENT (OUTPATIENT)
Dept: AUDIOLOGY | Facility: HOSPITAL | Age: 85
End: 2025-03-13
Payer: MEDICARE

## 2025-03-13 DIAGNOSIS — H93.13 SUBJECTIVE TINNITUS OF BOTH EARS: ICD-10-CM

## 2025-03-13 DIAGNOSIS — H90.3 SENSORINEURAL HEARING LOSS (SNHL) OF BOTH EARS: Primary | ICD-10-CM

## 2025-03-13 PROCEDURE — 92550 TYMPANOMETRY & REFLEX THRESH: CPT | Performed by: AUDIOLOGIST

## 2025-03-13 PROCEDURE — 92557 COMPREHENSIVE HEARING TEST: CPT | Performed by: AUDIOLOGIST

## 2025-03-13 ASSESSMENT — PAIN SCALES - GENERAL: PAINLEVEL_OUTOF10: 1

## 2025-03-13 ASSESSMENT — PAIN - FUNCTIONAL ASSESSMENT: PAIN_FUNCTIONAL_ASSESSMENT: 0-10

## 2025-03-13 NOTE — LETTER
2025         Ki Browning DO  [address] [facility], [address] [address]      Patient: Juanjose Douglass   YOB: 1940   Date of Visit: 3/13/2025       Dear Ki Browning DO:    Thank you for referring Juanjose Douglass to me for evaluation. Below are my notes for this consultation.  If you have questions, please do not hesitate to call me. I look forward to following your patient along with you.       Sincerely,     MIRELLA Pickett, CCC-A  Senior Audiologist      CC:   No Recipients  ______________________________________________________________________________________    AUDIOLOGY ADULT AUDIOMETRIC EVALUATION      Name:  Juanjose Douglass  :  1940  Age:  84 y.o.  Date of Evaluation:  3/13/2025     IMPRESSIONS:  Today's test results are consistent with mild to moderately severe sensorineural hearing loss with fair word discrimination and reduced tympanic membrane mobility bilaterally.  If there are no medical contraindications, this patient could be considered a good candidate for binaural amplification.     RECOMMENDATIONS:  -Medical / Otolaryngology consultation regarding reduced tympanic membrane mobility bilaterally   -Annual audiologic evaluation, as otherwise recommended by otolaryngologist, or as changes are noted.  -Hearing Aid consultation. Patient should check with insurance regarding benefits and covered providers.   -Consider use of tinnitus management options, which include but are not limited to the following:  sound therapy (use of pleasant or calming sounds that reduce tinnitus annoyance) and use of hearing aids when appropriate.    ------------------------------------------------    HISTORY:  Reason for visit:  Mr. Douglass is seen today at the request of Ki Browning DO for an evaluation of hearing.  Patient complains of Hearing Loss (Gradually progressive hearing loss bilaterally, worse in the right ear.   ")  His wife complains that he does not hear well, but he feels she is a \"soft speaker.\"       Previous hearing test:  yes, several years ago, tested elsewhere, showing some degree of hearing loss bilaterally   Tinnitus:   yes, bilateral, occurs occasionally, described as \"chainsaw\", longstanding, not generally bothersome except mildly in quiet environments, not pulsatile  Noise Exposure: yes, workshop with use of hearing protection devices   Hearing aid history: several years ago, but feels hearing was better after he left loud working environment so he stopped using the devices   Other significant history: diabetes diagnosis and treatment    Otalgia:  no  Aural Fullness:  no  Otitis Media: no  PE Tubes:  no  Other otologic surgical history:  no  Dizziness:  no  Family history of hearing loss:  no    EVALUATION    Otoscopic Evaluation:        Clear ear canal, tympanic membrane visualized bilaterally                Pure Tone Audiometry:  Conventional audiometry (125-8000Hz) via insert earphones with fair response reliability      Both ears: mild to moderately severe sensorineural hearing loss     Speech Audiometry:        Both ears:  Speech Reception Threshold (SRT) was obtained at 55 dBHL                 Word Recognition scores were fair at most comfortable listening level      Immittance Measures: 226Hz       Right ear:  Abnormal tympanogram, showing borderline negative middle ear pressure with normal ear canal volume and shallow static compliance. Acoustic reflexes were absent.         Left ear:  Tympanogram is abnormal, showing very low static compliance (flat configuration) with normal ear canal volume. Acoustic reflexes were absent.              Distortion Product Otoacoustic Emissions: Assesses the cochlear outer hair cell function (9193-8432 Hz frequency range)        Did not test.        PATIENT EDUCATION:   Discussed results and recommendations with Mr. Douglass.  Questions were addressed and the patient " was encouraged to contact our department should concerns arise.    Verified patient's identification verbally and by armband.    Time:  10:00 to 10:45    MIRELLA Pickett, CCC-A  Senior Audiologist

## 2025-03-13 NOTE — PROGRESS NOTES
"AUDIOLOGY ADULT AUDIOMETRIC EVALUATION      Name:  Juanjose Douglass  :  1940  Age:  84 y.o.  Date of Evaluation:  3/13/2025     IMPRESSIONS:  Today's test results are consistent with mild to moderately severe sensorineural hearing loss with fair word discrimination and reduced tympanic membrane mobility bilaterally.  If there are no medical contraindications, this patient could be considered a good candidate for binaural amplification.     RECOMMENDATIONS:  -Medical / Otolaryngology consultation regarding reduced tympanic membrane mobility bilaterally   -Annual audiologic evaluation, as otherwise recommended by otolaryngologist, or as changes are noted.  -Hearing Aid consultation. Patient should check with insurance regarding benefits and covered providers.   -Consider use of tinnitus management options, which include but are not limited to the following:  sound therapy (use of pleasant or calming sounds that reduce tinnitus annoyance) and use of hearing aids when appropriate.    ------------------------------------------------    HISTORY:  Reason for visit:  Mr. Douglass is seen today at the request of Ki Browning DO for an evaluation of hearing.  Patient complains of Hearing Loss (Gradually progressive hearing loss bilaterally, worse in the right ear.  )  His wife complains that he does not hear well, but he feels she is a \"soft speaker.\"       Previous hearing test:  yes, several years ago, tested elsewhere, showing some degree of hearing loss bilaterally   Tinnitus:   yes, bilateral, occurs occasionally, described as \"chainsaw\", longstanding, not generally bothersome except mildly in quiet environments, not pulsatile  Noise Exposure: yes, workshop with use of hearing protection devices   Hearing aid history: several years ago, but feels hearing was better after he left loud working environment so he stopped using the devices   Other significant history: diabetes diagnosis and " treatment    Otalgia:  no  Aural Fullness:  no  Otitis Media: no  PE Tubes:  no  Other otologic surgical history:  no  Dizziness:  no  Family history of hearing loss:  no    EVALUATION    Otoscopic Evaluation:        Clear ear canal, tympanic membrane visualized bilaterally                Pure Tone Audiometry:  Conventional audiometry (125-8000Hz) via insert earphones with fair response reliability      Both ears: mild to moderately severe sensorineural hearing loss     Speech Audiometry:        Both ears:  Speech Reception Threshold (SRT) was obtained at 55 dBHL                 Word Recognition scores were fair at most comfortable listening level      Immittance Measures: 226Hz       Right ear:  Abnormal tympanogram, showing borderline negative middle ear pressure with normal ear canal volume and shallow static compliance. Acoustic reflexes were absent.         Left ear:  Tympanogram is abnormal, showing very low static compliance (flat configuration) with normal ear canal volume. Acoustic reflexes were absent.              Distortion Product Otoacoustic Emissions: Assesses the cochlear outer hair cell function (4608-9430 Hz frequency range)        Did not test.        PATIENT EDUCATION:   Discussed results and recommendations with Mr. Douglass.  Questions were addressed and the patient was encouraged to contact our department should concerns arise.    Verified patient's identification verbally and by armband.    Time:  10:00 to 10:45    MIRELLA Pickett, CCC-A  Senior Audiologist

## 2025-04-10 ENCOUNTER — APPOINTMENT (OUTPATIENT)
Dept: PRIMARY CARE | Facility: CLINIC | Age: 85
End: 2025-04-10
Payer: MEDICARE

## 2025-04-10 VITALS
SYSTOLIC BLOOD PRESSURE: 122 MMHG | HEIGHT: 68 IN | HEART RATE: 68 BPM | WEIGHT: 238 LBS | BODY MASS INDEX: 36.07 KG/M2 | DIASTOLIC BLOOD PRESSURE: 70 MMHG

## 2025-04-10 DIAGNOSIS — E10.649 TYPE 1 DIABETES MELLITUS WITH HYPOGLYCEMIA AND WITHOUT COMA: ICD-10-CM

## 2025-04-10 DIAGNOSIS — E66.812 CLASS 2 SEVERE OBESITY DUE TO EXCESS CALORIES WITH SERIOUS COMORBIDITY AND BODY MASS INDEX (BMI) OF 36.0 TO 36.9 IN ADULT: ICD-10-CM

## 2025-04-10 DIAGNOSIS — F33.42 DEPRESSION, MAJOR, RECURRENT, IN COMPLETE REMISSION (CMS-HCC): ICD-10-CM

## 2025-04-10 DIAGNOSIS — Z79.4 DIABETES MELLITUS DUE TO UNDERLYING CONDITION WITH STAGE 3A CHRONIC KIDNEY DISEASE, WITH LONG-TERM CURRENT USE OF INSULIN (MULTI): ICD-10-CM

## 2025-04-10 DIAGNOSIS — N18.31 DIABETES MELLITUS DUE TO UNDERLYING CONDITION WITH STAGE 3A CHRONIC KIDNEY DISEASE, WITH LONG-TERM CURRENT USE OF INSULIN (MULTI): ICD-10-CM

## 2025-04-10 DIAGNOSIS — E10.69 DYSLIPIDEMIA DUE TO TYPE 1 DIABETES MELLITUS (MULTI): ICD-10-CM

## 2025-04-10 DIAGNOSIS — E03.9 HYPOTHYROIDISM (ACQUIRED): ICD-10-CM

## 2025-04-10 DIAGNOSIS — I11.0 HYPERTENSIVE HEART DISEASE WITH CHRONIC DIASTOLIC CONGESTIVE HEART FAILURE: ICD-10-CM

## 2025-04-10 DIAGNOSIS — E08.22 DIABETES MELLITUS DUE TO UNDERLYING CONDITION WITH STAGE 3A CHRONIC KIDNEY DISEASE, WITH LONG-TERM CURRENT USE OF INSULIN (MULTI): ICD-10-CM

## 2025-04-10 DIAGNOSIS — Z00.00 MEDICARE ANNUAL WELLNESS VISIT, SUBSEQUENT: Primary | ICD-10-CM

## 2025-04-10 DIAGNOSIS — E66.01 CLASS 2 SEVERE OBESITY DUE TO EXCESS CALORIES WITH SERIOUS COMORBIDITY AND BODY MASS INDEX (BMI) OF 36.0 TO 36.9 IN ADULT: ICD-10-CM

## 2025-04-10 DIAGNOSIS — E78.5 DYSLIPIDEMIA DUE TO TYPE 1 DIABETES MELLITUS (MULTI): ICD-10-CM

## 2025-04-10 DIAGNOSIS — M48.062 LUMBAR STENOSIS WITH NEUROGENIC CLAUDICATION: ICD-10-CM

## 2025-04-10 DIAGNOSIS — I50.32 HYPERTENSIVE HEART DISEASE WITH CHRONIC DIASTOLIC CONGESTIVE HEART FAILURE: ICD-10-CM

## 2025-04-10 DIAGNOSIS — R54 FRAILTY SYNDROME IN GERIATRIC PATIENT: ICD-10-CM

## 2025-04-10 DIAGNOSIS — K21.9 GASTROESOPHAGEAL REFLUX DISEASE WITHOUT ESOPHAGITIS: ICD-10-CM

## 2025-04-10 PROCEDURE — G2211 COMPLEX E/M VISIT ADD ON: HCPCS | Performed by: INTERNAL MEDICINE

## 2025-04-10 PROCEDURE — 1158F ADVNC CARE PLAN TLK DOCD: CPT | Performed by: INTERNAL MEDICINE

## 2025-04-10 PROCEDURE — 1123F ACP DISCUSS/DSCN MKR DOCD: CPT | Performed by: INTERNAL MEDICINE

## 2025-04-10 PROCEDURE — 1159F MED LIST DOCD IN RCRD: CPT | Performed by: INTERNAL MEDICINE

## 2025-04-10 PROCEDURE — 99214 OFFICE O/P EST MOD 30 MIN: CPT | Performed by: INTERNAL MEDICINE

## 2025-04-10 PROCEDURE — 1170F FXNL STATUS ASSESSED: CPT | Performed by: INTERNAL MEDICINE

## 2025-04-10 PROCEDURE — 1160F RVW MEDS BY RX/DR IN RCRD: CPT | Performed by: INTERNAL MEDICINE

## 2025-04-10 PROCEDURE — 1036F TOBACCO NON-USER: CPT | Performed by: INTERNAL MEDICINE

## 2025-04-10 ASSESSMENT — ACTIVITIES OF DAILY LIVING (ADL)
BATHING: INDEPENDENT
GROCERY_SHOPPING: INDEPENDENT
MANAGING_FINANCES: INDEPENDENT
TAKING_MEDICATION: INDEPENDENT
DRESSING: INDEPENDENT
DOING_HOUSEWORK: INDEPENDENT

## 2025-04-10 ASSESSMENT — ANXIETY QUESTIONNAIRES
6. BECOMING EASILY ANNOYED OR IRRITABLE: NOT AT ALL
GAD7 TOTAL SCORE: 3
3. WORRYING TOO MUCH ABOUT DIFFERENT THINGS: NEARLY EVERY DAY
5. BEING SO RESTLESS THAT IT IS HARD TO SIT STILL: NOT AT ALL
1. FEELING NERVOUS, ANXIOUS, OR ON EDGE: NOT AT ALL
IF YOU CHECKED OFF ANY PROBLEMS ON THIS QUESTIONNAIRE, HOW DIFFICULT HAVE THESE PROBLEMS MADE IT FOR YOU TO DO YOUR WORK, TAKE CARE OF THINGS AT HOME, OR GET ALONG WITH OTHER PEOPLE: NOT DIFFICULT AT ALL
7. FEELING AFRAID AS IF SOMETHING AWFUL MIGHT HAPPEN: NOT AT ALL
4. TROUBLE RELAXING: NOT AT ALL
2. NOT BEING ABLE TO STOP OR CONTROL WORRYING: NOT AT ALL

## 2025-04-10 ASSESSMENT — ENCOUNTER SYMPTOMS
OCCASIONAL FEELINGS OF UNSTEADINESS: 0
DEPRESSION: 0
LOSS OF SENSATION IN FEET: 0

## 2025-04-10 NOTE — ASSESSMENT & PLAN NOTE
Reevaluate thyroid functions on [id number] mcg daily  Orders:    Tsh With Reflex To Free T4 If Abnormal; Future

## 2025-04-10 NOTE — ASSESSMENT & PLAN NOTE
LDL goal less than 70 continue rosuvastatin 40 mg daily reevaluate with next blood work  Orders:    Comprehensive Metabolic Panel; Future    Hemoglobin A1C; Future    Lipid Panel; Future    Albumin-Creatinine Ratio, Urine Random; Future    Referral to Ophthalmology; Future    Follow Up In [facility] - PCP - Established; Future    Referral to Clinical Pharmacy; Future

## 2025-04-10 NOTE — PROGRESS NOTES
"Subjective   Reason for Visit: Juanjose Douglass is an 84 y.o. male here for a Medicare Wellness visit.     Past Medical, Surgical, and Family History reviewed and updated in chart.    Reviewed all medications by prescribing practitioner or clinical pharmacist (such as prescriptions, OTCs, herbal therapies and supplements) and documented in the medical record.    HPI    Patient Care Team:  Ki Browning DO as PCP - General  Ki Browning DO as PCP - [employer] PCP     Review of Systems   All other systems reviewed and are negative.      Objective   Vitals:  /70   Pulse 68   Ht 1.727 m (5' 8\")   Wt 108 kg (238 lb)   BMI 36.19 kg/m²       Physical Exam  Vitals and nursing note reviewed.   Constitutional:       General: He is not in acute distress.     Appearance: Normal appearance. He is well-developed. He is obese. He is not toxic-appearing.   HENT:      Head: Normocephalic and atraumatic.      Right Ear: Tympanic membrane and external ear normal.      Left Ear: Tympanic membrane and external ear normal.      Nose: Nose normal.      Mouth/Throat:      Mouth: Mucous membranes are moist.      Pharynx: Oropharynx is clear. No oropharyngeal exudate or posterior oropharyngeal erythema.      Tonsils: No tonsillar exudate. 2+ on the right. 2+ on the left.   Eyes:      Extraocular Movements: Extraocular movements intact.      Conjunctiva/sclera: Conjunctivae normal.   Cardiovascular:      Rate and Rhythm: Normal rate and regular rhythm.      Pulses: Normal pulses.      Heart sounds: Normal heart sounds. No murmur heard.  Pulmonary:      Effort: Pulmonary effort is normal.      Breath sounds: Normal breath sounds.   Abdominal:      General: Abdomen is flat. Bowel sounds are normal.      Palpations: Abdomen is soft.   Musculoskeletal:      Cervical back: Neck supple.   Feet:      Right foot:      Skin integrity: Skin integrity normal. No ulcer, blister, skin breakdown, erythema, warmth or " callus.      Toenail Condition: Right toenails are normal.      Left foot:      Skin integrity: Skin integrity normal. No ulcer, blister, skin breakdown, erythema, warmth or callus.      Toenail Condition: Left toenails are normal.   Lymphadenopathy:      Cervical: No cervical adenopathy.   Skin:     General: Skin is warm and dry.      Capillary Refill: Capillary refill takes more than 3 seconds.      Findings: No rash.   Neurological:      Mental Status: He is alert. Mental status is at baseline.      Sensory: Sensation is intact.   Psychiatric:         Mood and Affect: Mood normal.         Behavior: Behavior normal.         Thought Content: Thought content normal.         Judgment: Judgment normal.     Lifestyle Recommendations  I recommend a whole-food plant-based diet, an eating pattern that encourages the consumption of unrefined plant foods (such as fruits, vegetables, tubers, whole grains, legumes, nuts and seeds) and discourages meats, dairy products, eggs and processed foods.     The AHA/ACC recommends that the patient consume a dietary pattern that emphasizes intake of vegetables, fruits, and whole grains; includes low-fat dairy products, poultry, fish, legumes, non-tropical vegetable oils, and nuts; and limits intake of sodium, sweets, sugar-sweetened beverages, and red meats.  Adapt this dietary pattern to appropriate calorie requirements (a 500-750 kcal/day deficit to loose weight), personal and cultural food preferences, and nutrition therapy for other medical conditions (including diabetes).  Achieve this pattern by following plans such as the [place] Mediterranean, DASH dietary pattern, or AHA diet.     Engage in 2 hours and 30 minutes per week of moderate-intensity physical activity, or 1 hour and 15 minutes (75 minutes) per week of vigorous-intensity aerobic physical activity, or an equivalent combination of moderate and vigorous-intensity aerobic physical activity. Aerobic activity should be  performed in episodes of at least 10 minutes preferably spread throughout the week.     Adhering to a heart healthy diet, regular exercise habits, avoidance of tobacco products, and maintenance of a healthy weight are crucial components of their heart disease risk reduction.    Patient Health Questionnaire-2 Score: 0 (4/10/2025 12:08 PM).  This indicates a positive screen but may not meet the criteria for a clinical depression diagnosis. Symptoms were reviewed with Juanjose.  Follow-up within the next 3 months is recommended to re-assess symptoms and monitor mental health status.    Assessment & Plan  Dyslipidemia due to type 1 diabetes mellitus (Multi)  LDL goal less than 70 continue rosuvastatin 40 mg daily reevaluate with next blood work  Orders:    Comprehensive Metabolic Panel; Future    Hemoglobin A1C; Future    Lipid Panel; Future    Albumin-Creatinine Ratio, Urine Random; Future    Referral to Ophthalmology; Future    Follow Up In [facility] - PCP - Established; Future    Referral to Clinical Pharmacy; Future    Hypertensive heart disease with chronic diastolic congestive heart failure  Well compensated on carvedilol 12.5 mg twice a day with lisinopril hydrochlorothiazide 20/25 1 tablet daily  Orders:    Comprehensive Metabolic Panel; Future    Hemoglobin A1C; Future    Lipid Panel; Future    Albumin-Creatinine Ratio, Urine Random; Future    Referral to Ophthalmology; Future    Follow Up In [facility] - PCP - Established; Future    Referral to Clinical Pharmacy; Future    Class 2 severe obesity due to excess calories with serious comorbidity and body mass index (BMI) of 36.0 to 36.9 in adult  The patient received Current weight: 108 kg (238 lb)  Weight change since last visit (-) denotes wt loss -3.62 lbs   Weight loss needed to achieve BMI 25: 73.9 Lbs  Weight loss needed to achieve BMI 30: 41.1 Lbs    Provided instructions on dietary changes  Provided instructions on exercise  Advised  to Increase physical activity because they have an above normal BMI.        Diabetes 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 3a chronic kidney disease, with long-term current use of insulin (Multi)  Continue Toujeo insulin 50 units daily using Dexcom meter still reporting high blood sugars between breakfast and lunch and after dinner adjust NovoLog doses to 6 units at breakfast 2 units at lunch and 6 units prior to dinner ask clinical pharmacist to help adjust blood sugars carefully to avoid severe hyper and hypoglycemia current average has been slightly improved at 8.6 no severe hypoglycemic events reported       Hypothyroidism (acquired)  Reevaluate thyroid functions on [id number] mcg daily  Orders:    Tsh With Reflex To Free T4 If Abnormal; Future    Type 1 diabetes mellitus with hypoglycemia and without coma  Reevaluate blood sugars on current regimen at this time continue to follow on a 3-month basis referral made for diabetic eye exam symptoms stable and controlled on lansoprazole  Orders:    Comprehensive Metabolic Panel; Future    Hemoglobin A1C; Future    Lipid Panel; Future    Albumin-Creatinine Ratio, Urine Random; Future    Referral to Ophthalmology; Future    Follow Up In [facility] - PCP - Established; Future    Referral to Clinical Pharmacy; Future    Gastroesophageal reflux disease without esophagitis  Stable on lansoprazole 30 mg daily       Medicare annual wellness visit, subsequent  Discussed advanced medical directives with wife as medical power of  follow-up in 3 months       Depression, major, recurrent, in complete remission (CMS-HCC)  Continue [id number] mg daily with buspirone 30 mg twice a day       Frailty syndrome in geriatric patient  Stable functioning at home with care of wife       Lumbar stenosis with neurogenic claudication  Continue gabapentin

## 2025-04-10 NOTE — ASSESSMENT & PLAN NOTE
The patient received Current weight: 108 kg (238 lb)  Weight change since last visit (-) denotes wt loss -3.62 lbs   Weight loss needed to achieve BMI 25: 73.9 Lbs  Weight loss needed to achieve BMI 30: 41.1 Lbs    Provided instructions on dietary changes  Provided instructions on exercise  Advised to Increase physical activity because they have an above normal BMI.

## 2025-04-10 NOTE — ASSESSMENT & PLAN NOTE
Continue Toujeo insulin 50 units daily using Dexcom meter still reporting high blood sugars between breakfast and lunch and after dinner adjust NovoLog doses to 6 units at breakfast 2 units at lunch and 6 units prior to dinner ask clinical pharmacist to help adjust blood sugars carefully to avoid severe hyper and hypoglycemia current average has been slightly improved at 8.6 no severe hypoglycemic events reported

## 2025-04-10 NOTE — PROGRESS NOTES
"Subjective   Reason for Visit: Juanjose Douglass is an 84 y.o. male here for a Medicare Wellness visit.          Reviewed all medications by prescribing practitioner or clinical pharmacist (such as prescriptions, OTCs, herbal therapies and supplements) and documented in the medical record.    HPI    Patient Care Team:  Ki Browning DO as PCP - General  Ki Browning DO as PCP - [employer] PCP     Review of Systems    Objective   Vitals:  /70   Pulse 68   Ht 1.727 m (5' 8\")   Wt 108 kg (238 lb)   BMI 36.19 kg/m²       Physical Exam    Assessment & Plan              "

## 2025-04-10 NOTE — ASSESSMENT & PLAN NOTE
Well compensated on carvedilol 12.5 mg twice a day with lisinopril hydrochlorothiazide 20/25 1 tablet daily  Orders:    Comprehensive Metabolic Panel; Future    Hemoglobin A1C; Future    Lipid Panel; Future    Albumin-Creatinine Ratio, Urine Random; Future    Referral to Ophthalmology; Future    Follow Up In [facility] - PCP - Established; Future    Referral to Clinical Pharmacy; Future

## 2025-04-28 ENCOUNTER — APPOINTMENT (OUTPATIENT)
Dept: PHARMACY | Facility: HOSPITAL | Age: 85
End: 2025-04-28
Payer: MEDICARE

## 2025-04-28 DIAGNOSIS — E10.649 TYPE 1 DIABETES MELLITUS WITH HYPOGLYCEMIA AND WITHOUT COMA: ICD-10-CM

## 2025-04-28 DIAGNOSIS — E10.69 DYSLIPIDEMIA DUE TO TYPE 1 DIABETES MELLITUS (MULTI): ICD-10-CM

## 2025-04-28 DIAGNOSIS — I11.0 HYPERTENSIVE HEART DISEASE WITH CHRONIC DIASTOLIC CONGESTIVE HEART FAILURE: ICD-10-CM

## 2025-04-28 DIAGNOSIS — E78.5 DYSLIPIDEMIA DUE TO TYPE 1 DIABETES MELLITUS (MULTI): ICD-10-CM

## 2025-04-28 DIAGNOSIS — I50.32 HYPERTENSIVE HEART DISEASE WITH CHRONIC DIASTOLIC CONGESTIVE HEART FAILURE: ICD-10-CM

## 2025-04-28 ASSESSMENT — ENCOUNTER SYMPTOMS
DIABETIC ASSOCIATED SYMPTOMS: 0
SWEATS: 1
CONFUSION: 1
DIZZINESS: 1

## 2025-04-28 NOTE — ASSESSMENT & PLAN NOTE
- Continue Toujeo 50 units nightly  - Lower Novolog to 6 units with breakfast, 4 units with lunch, and 2 units with dinner. Consider implementing sliding scale in the future.

## 2025-04-28 NOTE — PROGRESS NOTES
Pharmacy Clinic Note    Juanjose Douglass is a 84 y.o. male was referred to Clinical Pharmacy Team for diabetes management.     Referring Provider: Ki Browning DO    Subjective   Allergies[1]    [id number] - [place] - [address]  [address]  [place] [address]  Phone: [phone] Fax: [phone]    [facility] - [place], [place] - [address]  [address] [address]  Phone: [phone] Fax: [phone]      Diabetes  He presents for his initial diabetic visit. He has type 1 diabetes mellitus. His disease course has been fluctuating. Hypoglycemia symptoms include confusion, dizziness and sweats. There are no diabetic associated symptoms. Risk factors for coronary artery disease include diabetes mellitus, male sex and obesity. Current diabetic treatments: Toujeo Solostar 50 units nightly; Novolog pen 6 units with breakfast, 4 units with lunch, 4 units with dinner. His home blood glucose trend is fluctuating minimally. His breakfast blood glucose range is generally 70-90 mg/dl. His lunch blood glucose range is generally 140-180 mg/dl. His dinner blood glucose range is generally 140-180 mg/dl. His bedtime blood glucose range is generally 140-180 mg/dl. His overall blood glucose range is 140-180 mg/dl.       Objective     There were no vitals taken for this visit.     LAB  Lab Results   Component Value Date    BILITOT 0.4 01/05/2025    CALCIUM 8.7 01/05/2025    CO2 21 01/05/2025     01/05/2025    CREATININE 1.50 (H) 01/05/2025    GLUCOSE 197 (H) 01/05/2025    ALKPHOS 78 01/05/2025    K 4.0 01/05/2025    PROT 6.3 (L) 01/05/2025     (L) 01/05/2025    AST 15 01/05/2025    ALT 11 01/05/2025    BUN 35 (H) 01/05/2025    ANIONGAP 13 01/05/2025    MG 1.95 01/05/2025    ALBUMIN 3.6 01/05/2025    LIPASE 20 01/05/2025     Lab Results   Component Value Date    TRIG 73 08/09/2024    CHOL 140 08/09/2024    LDLCALC 74  "2024    HDL 51.9 2024     Lab Results   Component Value Date    HGBA1C 8.6 (H) 2024     Estimated body mass index is 36.19 kg/m² as calculated from the following:    Height as of 4/10/25: 1.727 m (5' 8\").    Weight as of 4/10/25: 108 kg (238 lb).    Medications Ordered Prior to Encounter[2]     HISTORICAL PHARMACOTHERAPY  - N/A    SMBG (if applicable):  - Patient uses Dexcom, but only with reader device  - FBG 80-90 with some lows  - -150, though drops greatly after  minutes often 3-4 times weekly  - Patient states BG rarely in the 200-300 range after meals, but admits to not waiting 2 hours to test    DRUG INTERACTIONS  - No significant drug-drug interactions exist that require change in therapy  _______________________________________________________________________  PATIENT EDUCATION/GOALS    1. Discussed disease specific goals:   - Fasting B - 130 mg/dL  - Postprandial BG: less than 180 mg/dL  - A1c: less than 7%    2. Patient referred to pharmacy team to assist with BG fluctuations, specifically fluctuations between hyperglycemia and symptomatic lows. Patient states that he is currently most worried about hypoglycemia that occurs at least 3-4 times weekly, mostly after dinner. He states that dinner is often carbohydrate light and suspects that this is why lows occur. Given history of severe hypoglycemia, wew ill focus on correcting lows first, then proceed to highs:  - Continue Toujeo 50 units nightly  - Lower Novolog to 6 units with breakfast, 4 units with lunch, and 2 units with dinner. Consider implementing sliding scale in the future.  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 Consider implementing sliding scale in the future.         Hypertensive heart disease with chronic diastolic congestive heart failure        Continue all meds under the continuation of care with the referring provider and clinical pharmacy team.    Clinical Pharmacist follow-up: 5/9 at 11 am    Josh Ren, Nguyễn     Verbal consent to manage patient's drug therapy was obtained from [the patient and/or an individual authorized to act on behalf of a patient]. They were informed they may decline to participate or withdraw from participation in pharmacy services at any time.        [1]   Allergies  Allergen Reactions    Sulfa (Sulfonamide Antibiotics) Rash    Sulfamethoxazole Rash   [2]   Current Outpatient Medications on File Prior to Visit   Medication Sig Dispense Refill    Accu-Chek Guide test strips strip TEST FOUR TIMES DAILY 350 strip 10    Accu-Chek Softclix Lancets misc if needed.      BD Alcohol Swabs pads, medicated       blood-glucose sensor (Dexcom G6 Sensor) device 1 Device machine 1 each 0    busPIRone (Buspar) 30 mg tablet Take 1 tablet (30 mg) by mouth 2 times a day. 180 tablet 3    carvedilol (Coreg) 12.5 mg tablet TAKE 1 TABLET EVERY 12 HOURS 180 tablet 3    Dexcom G4 platinum  (Dexcom G6 ) misc Use as instructed to use to test sugars 3 each 3    ergocalciferol (Vitamin D-2) 1.25 MG (04320 UT) capsule TAKE 1 CAPSULE ONE TIME WEEKLY 12 capsule 3    glucagon (Baqsimi) 3 mg/actuation spray,non-aerosol Administer into affected nostril(s).      insulin glargine (Toujeo Max U-300 SoloStar) 300 unit/mL (3 mL) injection Inject 50 Units under the skin once daily. 15 mL 3    lansoprazole (Prevacid) 30 mg DR capsule TAKE 1 CAPSULE EVERY DAY 90 capsule 3    levothyroxine (Synthroid, Levoxyl) 50 mcg tablet Take 1 tablet (50 mcg) by mouth once daily in the morning. Take before meals. 90 tablet 3    lisinopriL-hydrochlorothiazide 20-25 mg tablet Take 1  "tablet by mouth once daily. 90 tablet 3    NovoLOG Flexpen U-100 Insulin 100 unit/mL (3 mL) pen INJECT 4 UNITS WITH BREAKFAST, 2 UNITS WITH LUNCH, AND 4 UNITS WITH DINNER. DISCARD PEN 28 DAYS AFTER OPENING. 15 mL 3    PARoxetine (Paxil) 40 mg tablet Take 1 tablet (40 mg) by mouth once daily in the morning. 90 tablet 3    pen needle, diabetic (Droplet Pen Needle) 31 gauge x 5/16\" needle 1 each by abdominal subcutaneous route 4 times a day. 200 each 3    rosuvastatin (Crestor) 40 mg tablet Take 1 tablet (40 mg) by mouth once daily. 90 tablet 3    triamcinolone (Kenalog) 0.5 % cream Apply topically. Apply to affected areas 2-3 times daily       No current facility-administered medications on file prior to visit.     "

## 2025-05-09 ENCOUNTER — APPOINTMENT (OUTPATIENT)
Dept: PHARMACY | Facility: HOSPITAL | Age: 85
End: 2025-05-09
Payer: MEDICARE

## 2025-05-13 NOTE — ASSESSMENT & PLAN NOTE
Dominique Bass  [address] [place] [address]        5/15/2025    Dear Dominique:     We have made several attempts to contact you and have been unsuccessful in reaching you.  Please call our office at [phone] to follow up with Dr. Santo Chandler to:Schedule a follow up visit.     Thank you for allowing us to partner in your care.  Any questions/concerns, please contact our office.  If you have chosen another physician/provider to follow with your care, please contact our office with that information as well.        Sincerely,       Appointment Desk  [facility]  Medical Office [address]  [address] [place]  [address]  Phone:  [phone]         Hemoglobin A1c improving at 9.0 with use of Dexcom to reduce nocturnal hypoglycemia reduce short acting Humalog coverage with meals to 40 units with meals continue Toujeo insulin at 46 units daily

## 2025-05-31 DIAGNOSIS — E03.9 HYPOTHYROIDISM (ACQUIRED): ICD-10-CM

## 2025-06-02 RX ORDER — LEVOTHYROXINE SODIUM 50 UG/1
TABLET ORAL
Qty: 90 TABLET | Refills: 3 | Status: SHIPPED | OUTPATIENT
Start: 2025-06-02

## 2025-06-24 ENCOUNTER — TELEPHONE (OUTPATIENT)
Dept: PRIMARY CARE | Facility: CLINIC | Age: 85
End: 2025-06-24
Payer: MEDICARE

## 2025-07-16 ENCOUNTER — APPOINTMENT (OUTPATIENT)
Dept: PRIMARY CARE | Facility: CLINIC | Age: 85
End: 2025-07-16
Payer: MEDICARE

## 2025-07-23 DIAGNOSIS — F41.9 ANXIETY: ICD-10-CM

## 2025-07-23 DIAGNOSIS — F33.42 DEPRESSION, MAJOR, RECURRENT, IN COMPLETE REMISSION: ICD-10-CM

## 2025-07-23 RX ORDER — BUSPIRONE HYDROCHLORIDE 30 MG/1
30 TABLET ORAL 2 TIMES DAILY
Qty: 180 TABLET | Refills: 3 | Status: SHIPPED | OUTPATIENT
Start: 2025-07-23

## 2025-07-23 NOTE — TELEPHONE ENCOUNTER
Patient called to request medication refill.    Last appointment with our providers: 4/10/2025    Next appointment with our providers: 8/14/2025    Name of Medication: busPIRone (Buspar) 30 mg tablet     Pharmacy:   [facility]

## 2025-08-04 ENCOUNTER — TELEPHONE (OUTPATIENT)
Dept: PRIMARY CARE | Facility: CLINIC | Age: 85
End: 2025-08-04
Payer: MEDICARE

## 2025-08-04 DIAGNOSIS — K21.9 GASTROESOPHAGEAL REFLUX DISEASE WITHOUT ESOPHAGITIS: ICD-10-CM

## 2025-08-04 RX ORDER — LANSOPRAZOLE 30 MG/1
30 CAPSULE, DELAYED RELEASE ORAL DAILY
Qty: 90 CAPSULE | Refills: 3 | Status: SHIPPED | OUTPATIENT
Start: 2025-08-04

## 2025-08-04 NOTE — TELEPHONE ENCOUNTER
lansoprazole (Prevacid) 30 mg DR capsule [[id number]]    Order Details  Dose, Route, Frequency: As Directed   Dispense Quantity: 90 capsule Refills: 3    Duration: -- Dispense As Written: No          Sig: TAKE 1 CAPSULE EVERY DAY         Start Date: 07/17/24 End Date: --   Written Date: 07/17/24 Rx Expiration Date: 07/17/25        Associated Diagnoses: Gastroesophageal reflux disease without esophagitis [K21.9]   Original Order: lansoprazole (Prevacid) 30 mg DR capsule [[id number]]   Pharmacy    [facility] - [facility] [address]   Sched 8/28  Needs refills mailed to him

## 2025-08-14 ENCOUNTER — APPOINTMENT (OUTPATIENT)
Dept: PRIMARY CARE | Facility: CLINIC | Age: 85
End: 2025-08-14
Payer: MEDICARE

## 2025-08-14 VITALS
SYSTOLIC BLOOD PRESSURE: 121 MMHG | BODY MASS INDEX: 36.25 KG/M2 | DIASTOLIC BLOOD PRESSURE: 63 MMHG | WEIGHT: 239.2 LBS | HEIGHT: 68 IN | OXYGEN SATURATION: 94 % | HEART RATE: 70 BPM

## 2025-08-14 DIAGNOSIS — N18.31 DIABETES MELLITUS DUE TO UNDERLYING CONDITION WITH STAGE 3A CHRONIC KIDNEY DISEASE, WITH LONG-TERM CURRENT USE OF INSULIN (MULTI): ICD-10-CM

## 2025-08-14 DIAGNOSIS — E66.01 CLASS 2 SEVERE OBESITY DUE TO EXCESS CALORIES WITH SERIOUS COMORBIDITY AND BODY MASS INDEX (BMI) OF 36.0 TO 36.9 IN ADULT: ICD-10-CM

## 2025-08-14 DIAGNOSIS — H81.13 BENIGN PAROXYSMAL POSITIONAL VERTIGO DUE TO BILATERAL VESTIBULAR DISORDER: ICD-10-CM

## 2025-08-14 DIAGNOSIS — E55.9 VITAMIN D DEFICIENCY DUE TO CHRONIC KIDNEY DISEASE: ICD-10-CM

## 2025-08-14 DIAGNOSIS — F33.42 DEPRESSION, MAJOR, RECURRENT, IN COMPLETE REMISSION: ICD-10-CM

## 2025-08-14 DIAGNOSIS — E10.69 DYSLIPIDEMIA DUE TO TYPE 1 DIABETES MELLITUS (MULTI): ICD-10-CM

## 2025-08-14 DIAGNOSIS — N18.9 VITAMIN D DEFICIENCY DUE TO CHRONIC KIDNEY DISEASE: ICD-10-CM

## 2025-08-14 DIAGNOSIS — E08.22 DIABETES MELLITUS DUE TO UNDERLYING CONDITION WITH STAGE 3A CHRONIC KIDNEY DISEASE, WITH LONG-TERM CURRENT USE OF INSULIN (MULTI): ICD-10-CM

## 2025-08-14 DIAGNOSIS — R54 FRAILTY SYNDROME IN GERIATRIC PATIENT: ICD-10-CM

## 2025-08-14 DIAGNOSIS — I11.0 HYPERTENSIVE HEART DISEASE WITH CHRONIC DIASTOLIC CONGESTIVE HEART FAILURE: ICD-10-CM

## 2025-08-14 DIAGNOSIS — I50.32 HYPERTENSIVE HEART DISEASE WITH CHRONIC DIASTOLIC CONGESTIVE HEART FAILURE: ICD-10-CM

## 2025-08-14 DIAGNOSIS — E78.5 DYSLIPIDEMIA DUE TO TYPE 1 DIABETES MELLITUS (MULTI): ICD-10-CM

## 2025-08-14 DIAGNOSIS — E66.812 CLASS 2 SEVERE OBESITY DUE TO EXCESS CALORIES WITH SERIOUS COMORBIDITY AND BODY MASS INDEX (BMI) OF 36.0 TO 36.9 IN ADULT: ICD-10-CM

## 2025-08-14 DIAGNOSIS — D51.8 VITAMIN B12 DEFICIENCY (DIETARY) ANEMIA: ICD-10-CM

## 2025-08-14 DIAGNOSIS — Z79.4 DIABETES MELLITUS DUE TO UNDERLYING CONDITION WITH STAGE 3A CHRONIC KIDNEY DISEASE, WITH LONG-TERM CURRENT USE OF INSULIN (MULTI): ICD-10-CM

## 2025-08-14 DIAGNOSIS — R26.81 GAIT INSTABILITY: ICD-10-CM

## 2025-08-14 DIAGNOSIS — E10.649 TYPE 1 DIABETES MELLITUS WITH HYPOGLYCEMIA AND WITHOUT COMA: Primary | ICD-10-CM

## 2025-08-14 PROCEDURE — 99214 OFFICE O/P EST MOD 30 MIN: CPT | Performed by: INTERNAL MEDICINE

## 2025-08-14 PROCEDURE — 1036F TOBACCO NON-USER: CPT | Performed by: INTERNAL MEDICINE

## 2025-08-14 PROCEDURE — G2211 COMPLEX E/M VISIT ADD ON: HCPCS | Performed by: INTERNAL MEDICINE

## 2025-08-14 PROCEDURE — 1160F RVW MEDS BY RX/DR IN RCRD: CPT | Performed by: INTERNAL MEDICINE

## 2025-08-14 PROCEDURE — 1159F MED LIST DOCD IN RCRD: CPT | Performed by: INTERNAL MEDICINE

## 2025-08-14 RX ORDER — INSULIN GLARGINE 300 [IU]/ML
52 INJECTION, SOLUTION SUBCUTANEOUS DAILY
Qty: 15.6 ML | Refills: 3 | Status: SHIPPED | OUTPATIENT
Start: 2025-08-14 | End: 2026-08-14

## 2025-08-14 ASSESSMENT — PATIENT HEALTH QUESTIONNAIRE - PHQ9
2. FEELING DOWN, DEPRESSED OR HOPELESS: NOT AT ALL
SUM OF ALL RESPONSES TO PHQ9 QUESTIONS 1 AND 2: 0
1. LITTLE INTEREST OR PLEASURE IN DOING THINGS: NOT AT ALL

## 2025-08-14 ASSESSMENT — ENCOUNTER SYMPTOMS
OCCASIONAL FEELINGS OF UNSTEADINESS: 1
LOSS OF SENSATION IN FEET: 0
DEPRESSION: 0

## 2025-09-07 LAB
25(OH)D3+25(OH)D2 SERPL-MCNC: 88 NG/ML (ref 30–100)
ALBUMIN SERPL-MCNC: 3.7 G/DL (ref 3.6–5.1)
ALBUMIN/CREAT UR: NORMAL
ALP SERPL-CCNC: 91 U/L (ref 35–144)
ALT SERPL-CCNC: 9 U/L (ref 9–46)
ANION GAP SERPL CALCULATED.4IONS-SCNC: 9 MMOL/L (CALC) (ref 7–17)
AST SERPL-CCNC: 13 U/L (ref 10–35)
BASOPHILS # BLD AUTO: 91 CELLS/UL (ref 0–200)
BASOPHILS NFR BLD AUTO: 1.4 %
BILIRUB SERPL-MCNC: 0.4 MG/DL (ref 0.2–1.2)
BUN SERPL-MCNC: 29 MG/DL (ref 7–25)
CALCIUM SERPL-MCNC: 8.8 MG/DL (ref 8.6–10.3)
CHLORIDE SERPL-SCNC: 106 MMOL/L (ref 98–110)
CHOLEST SERPL-MCNC: 150 MG/DL
CHOLEST/HDLC SERPL: 2.7 (CALC)
CO2 SERPL-SCNC: 23 MMOL/L (ref 20–32)
CREAT SERPL-MCNC: 1.57 MG/DL (ref 0.7–1.22)
CREAT UR-MCNC: NORMAL MG/DL
EGFRCR SERPLBLD CKD-EPI 2021: 43 ML/MIN/1.73M2
EOSINOPHIL # BLD AUTO: 475 CELLS/UL (ref 15–500)
EOSINOPHIL NFR BLD AUTO: 7.3 %
ERYTHROCYTE [DISTWIDTH] IN BLOOD BY AUTOMATED COUNT: 13 % (ref 11–15)
EST. AVERAGE GLUCOSE BLD GHB EST-MCNC: 192 MG/DL
EST. AVERAGE GLUCOSE BLD GHB EST-SCNC: 10.6 MMOL/L
GLUCOSE SERPL-MCNC: 121 MG/DL (ref 65–99)
HBA1C MFR BLD: 8.3 %
HCT VFR BLD AUTO: 40.9 % (ref 38.5–50)
HDLC SERPL-MCNC: 56 MG/DL
HGB BLD-MCNC: 13.5 G/DL (ref 13.2–17.1)
LDLC SERPL CALC-MCNC: 79 MG/DL (CALC)
LYMPHOCYTES # BLD AUTO: 592 CELLS/UL (ref 850–3900)
LYMPHOCYTES NFR BLD AUTO: 9.1 %
MCH RBC QN AUTO: 29.5 PG (ref 27–33)
MCHC RBC AUTO-ENTMCNC: 33 G/DL (ref 32–36)
MCV RBC AUTO: 89.3 FL (ref 80–100)
MICROALBUMIN UR-MCNC: NORMAL
MONOCYTES # BLD AUTO: 774 CELLS/UL (ref 200–950)
MONOCYTES NFR BLD AUTO: 11.9 %
NEUTROPHILS # BLD AUTO: 4570 CELLS/UL (ref 1500–7800)
NEUTROPHILS NFR BLD AUTO: 70.3 %
NONHDLC SERPL-MCNC: 94 MG/DL (CALC)
PLATELET # BLD AUTO: 260 THOUSAND/UL (ref 140–400)
PMV BLD REES-ECKER: 10.9 FL (ref 7.5–12.5)
POTASSIUM SERPL-SCNC: 4.5 MMOL/L (ref 3.5–5.3)
PROT SERPL-MCNC: 6.2 G/DL (ref 6.1–8.1)
RBC # BLD AUTO: 4.58 MILLION/UL (ref 4.2–5.8)
SODIUM SERPL-SCNC: 138 MMOL/L (ref 135–146)
TRIGL SERPL-MCNC: 70 MG/DL
TSH SERPL-ACNC: 2.43 MIU/L (ref 0.4–4.5)
VIT B12 SERPL-MCNC: 467 PG/ML (ref 200–1100)
WBC # BLD AUTO: 6.5 THOUSAND/UL (ref 3.8–10.8)

## 2025-12-02 ENCOUNTER — APPOINTMENT (OUTPATIENT)
Dept: PRIMARY CARE | Facility: CLINIC | Age: 85
End: 2025-12-02
Payer: MEDICARE

## 2025-12-03 ENCOUNTER — APPOINTMENT (OUTPATIENT)
Dept: PRIMARY CARE | Facility: CLINIC | Age: 85
End: 2025-12-03
Payer: MEDICARE